# Patient Record
Sex: FEMALE | Race: WHITE | NOT HISPANIC OR LATINO | Employment: OTHER | URBAN - METROPOLITAN AREA
[De-identification: names, ages, dates, MRNs, and addresses within clinical notes are randomized per-mention and may not be internally consistent; named-entity substitution may affect disease eponyms.]

---

## 2023-01-01 ENCOUNTER — APPOINTMENT (OUTPATIENT)
Dept: RADIOLOGY | Facility: HOSPITAL | Age: 88
DRG: 871 | End: 2023-01-01
Payer: MEDICARE

## 2023-01-01 ENCOUNTER — APPOINTMENT (OUTPATIENT)
Dept: CARDIOLOGY | Facility: HOSPITAL | Age: 88
DRG: 871 | End: 2023-01-01
Payer: MEDICARE

## 2023-01-01 ENCOUNTER — HOME HEALTH ADMISSION (OUTPATIENT)
Dept: HOME HEALTH SERVICES | Facility: HOME HEALTH | Age: 88
End: 2023-01-01
Payer: MEDICARE

## 2023-01-01 ENCOUNTER — APPOINTMENT (OUTPATIENT)
Dept: HOME HEALTH SERVICES | Facility: HOME HEALTH | Age: 88
End: 2023-01-01
Payer: MEDICARE

## 2023-01-01 ENCOUNTER — HOSPITAL ENCOUNTER (INPATIENT)
Facility: HOSPITAL | Age: 88
LOS: 1 days | Discharge: HOME HEALTH | DRG: 871 | End: 2023-11-21
Attending: STUDENT IN AN ORGANIZED HEALTH CARE EDUCATION/TRAINING PROGRAM | Admitting: INTERNAL MEDICINE
Payer: MEDICARE

## 2023-01-01 ENCOUNTER — ANCILLARY PROCEDURE (OUTPATIENT)
Dept: RADIOLOGY | Facility: CLINIC | Age: 88
End: 2023-01-01
Payer: MEDICARE

## 2023-01-01 ENCOUNTER — APPOINTMENT (OUTPATIENT)
Dept: RADIOLOGY | Facility: CLINIC | Age: 88
End: 2023-01-01
Payer: MEDICARE

## 2023-01-01 ENCOUNTER — HOSPITAL ENCOUNTER (INPATIENT)
Facility: HOSPITAL | Age: 88
LOS: 1 days | DRG: 871 | End: 2023-12-26
Attending: STUDENT IN AN ORGANIZED HEALTH CARE EDUCATION/TRAINING PROGRAM | Admitting: INTERNAL MEDICINE
Payer: MEDICARE

## 2023-01-01 ENCOUNTER — HOSPITAL ENCOUNTER (OUTPATIENT)
Dept: CARDIOLOGY | Facility: HOSPITAL | Age: 88
Discharge: HOME | End: 2023-11-29
Payer: MEDICARE

## 2023-01-01 ENCOUNTER — LAB REQUISITION (OUTPATIENT)
Dept: LAB | Facility: LAB | Age: 88
End: 2023-01-01
Payer: MEDICARE

## 2023-01-01 ENCOUNTER — APPOINTMENT (OUTPATIENT)
Dept: DIALYSIS | Facility: HOSPITAL | Age: 88
End: 2023-01-01
Payer: MEDICARE

## 2023-01-01 VITALS
HEART RATE: 68 BPM | RESPIRATION RATE: 22 BRPM | BODY MASS INDEX: 25.11 KG/M2 | HEIGHT: 61 IN | OXYGEN SATURATION: 89 % | WEIGHT: 133 LBS | SYSTOLIC BLOOD PRESSURE: 81 MMHG | TEMPERATURE: 95.7 F | DIASTOLIC BLOOD PRESSURE: 42 MMHG

## 2023-01-01 VITALS
WEIGHT: 133.82 LBS | HEART RATE: 82 BPM | BODY MASS INDEX: 25.27 KG/M2 | TEMPERATURE: 96.6 F | HEIGHT: 61 IN | OXYGEN SATURATION: 95 % | RESPIRATION RATE: 18 BRPM | DIASTOLIC BLOOD PRESSURE: 53 MMHG | SYSTOLIC BLOOD PRESSURE: 95 MMHG

## 2023-01-01 DIAGNOSIS — A41.9 SEPSIS DUE TO CELLULITIS (MULTI): ICD-10-CM

## 2023-01-01 DIAGNOSIS — L03.90 SEPSIS DUE TO CELLULITIS (MULTI): Primary | ICD-10-CM

## 2023-01-01 DIAGNOSIS — Z87.01 PERSONAL HISTORY OF PNEUMONIA (RECURRENT): ICD-10-CM

## 2023-01-01 DIAGNOSIS — R65.21 SEPTIC SHOCK (MULTI): ICD-10-CM

## 2023-01-01 DIAGNOSIS — R60.0 LEG EDEMA: ICD-10-CM

## 2023-01-01 DIAGNOSIS — N18.6 CKD (CHRONIC KIDNEY DISEASE) STAGE V REQUIRING CHRONIC DIALYSIS (MULTI): ICD-10-CM

## 2023-01-01 DIAGNOSIS — R00.1 BRADYCARDIA: ICD-10-CM

## 2023-01-01 DIAGNOSIS — R57.9 SHOCK (MULTI): Primary | ICD-10-CM

## 2023-01-01 DIAGNOSIS — L03.90 SEPSIS DUE TO CELLULITIS (MULTI): ICD-10-CM

## 2023-01-01 DIAGNOSIS — R53.1 WEAKNESS: ICD-10-CM

## 2023-01-01 DIAGNOSIS — R57.0 CARDIOGENIC SHOCK (MULTI): ICD-10-CM

## 2023-01-01 DIAGNOSIS — A41.9 SEPTIC SHOCK (MULTI): ICD-10-CM

## 2023-01-01 DIAGNOSIS — I48.91 ATRIAL FIBRILLATION, UNSPECIFIED TYPE (MULTI): ICD-10-CM

## 2023-01-01 DIAGNOSIS — R79.89 ELEVATED TROPONIN: ICD-10-CM

## 2023-01-01 DIAGNOSIS — S81.801A LEG WOUND, RIGHT, INITIAL ENCOUNTER: ICD-10-CM

## 2023-01-01 DIAGNOSIS — R06.02 SHORTNESS OF BREATH: ICD-10-CM

## 2023-01-01 DIAGNOSIS — Z99.2 CKD (CHRONIC KIDNEY DISEASE) STAGE V REQUIRING CHRONIC DIALYSIS (MULTI): ICD-10-CM

## 2023-01-01 DIAGNOSIS — E87.20 LACTIC ACIDOSIS: ICD-10-CM

## 2023-01-01 DIAGNOSIS — T68.XXXA HYPOTHERMIA, INITIAL ENCOUNTER: ICD-10-CM

## 2023-01-01 DIAGNOSIS — M79.89 SWELLING OF ARM: ICD-10-CM

## 2023-01-01 DIAGNOSIS — R09.89 OTHER SPECIFIED SYMPTOMS AND SIGNS INVOLVING THE CIRCULATORY AND RESPIRATORY SYSTEMS: ICD-10-CM

## 2023-01-01 DIAGNOSIS — L08.9 LOCAL INFECTION OF THE SKIN AND SUBCUTANEOUS TISSUE, UNSPECIFIED: ICD-10-CM

## 2023-01-01 DIAGNOSIS — A41.9 SEPSIS DUE TO CELLULITIS (MULTI): Primary | ICD-10-CM

## 2023-01-01 LAB
ALANINE AMINOTRANSFERASE (SGPT) (U/L) IN SER/PLAS: 18 U/L (ref 7–45)
ALBUMIN (G/DL) IN SER/PLAS: 3.6 G/DL (ref 3.4–5)
ALBUMIN SERPL BCP-MCNC: 2.2 G/DL (ref 3.4–5)
ALBUMIN SERPL BCP-MCNC: 2.6 G/DL (ref 3.4–5)
ALBUMIN SERPL BCP-MCNC: 3.3 G/DL (ref 3.4–5)
ALBUMIN SERPL BCP-MCNC: 3.5 G/DL (ref 3.4–5)
ALKALINE PHOSPHATASE (U/L) IN SER/PLAS: 142 U/L (ref 33–136)
ALP SERPL-CCNC: 180 U/L (ref 33–136)
ALP SERPL-CCNC: 188 U/L (ref 33–136)
ALP SERPL-CCNC: 205 U/L (ref 33–136)
ALP SERPL-CCNC: 254 U/L (ref 33–136)
ALT SERPL W P-5'-P-CCNC: 21 U/L (ref 7–45)
ALT SERPL W P-5'-P-CCNC: 24 U/L (ref 7–45)
ALT SERPL W P-5'-P-CCNC: 24 U/L (ref 7–45)
ALT SERPL W P-5'-P-CCNC: 28 U/L (ref 7–45)
AMMONIA PLAS-SCNC: 69 UMOL/L (ref 16–53)
ANION GAP BLDV CALCULATED.4IONS-SCNC: 16 MMOL/L (ref 10–25)
ANION GAP IN SER/PLAS: 13 MMOL/L (ref 10–20)
ANION GAP IN SER/PLAS: 14 MMOL/L (ref 10–20)
ANION GAP SERPL CALC-SCNC: 15 MMOL/L (ref 10–20)
ANION GAP SERPL CALC-SCNC: 16 MMOL/L (ref 10–20)
ANION GAP SERPL CALC-SCNC: 20 MMOL/L (ref 10–20)
ANION GAP SERPL CALC-SCNC: 22 MMOL/L (ref 10–20)
ANION GAP SERPL CALC-SCNC: 25 MMOL/L (ref 10–20)
APTT PPP: 34 SECONDS (ref 27–38)
ASPARTATE AMINOTRANSFERASE (SGOT) (U/L) IN SER/PLAS: 24 U/L (ref 9–39)
AST SERPL W P-5'-P-CCNC: 21 U/L (ref 9–39)
AST SERPL W P-5'-P-CCNC: 24 U/L (ref 9–39)
AST SERPL W P-5'-P-CCNC: 53 U/L (ref 9–39)
AST SERPL W P-5'-P-CCNC: 55 U/L (ref 9–39)
ATRIAL RATE: 108 BPM
BACTERIA BLD CULT: NORMAL
BACTERIA BLD CULT: NORMAL
BACTERIA SPEC CULT: ABNORMAL
BACTERIA SPEC CULT: ABNORMAL
BASE EXCESS BLDV CALC-SCNC: -2.1 MMOL/L (ref -2–3)
BASOPHILS # BLD AUTO: 0.02 X10*3/UL (ref 0–0.1)
BASOPHILS # BLD MANUAL: 0 X10*3/UL (ref 0–0.1)
BASOPHILS (10*3/UL) IN BLOOD BY AUTOMATED COUNT: 0.06 X10E9/L (ref 0–0.1)
BASOPHILS NFR BLD AUTO: 0.1 %
BASOPHILS NFR BLD MANUAL: 0 %
BASOPHILS/100 LEUKOCYTES IN BLOOD BY AUTOMATED COUNT: 1.2 % (ref 0–2)
BILIRUB SERPL-MCNC: 1.1 MG/DL (ref 0–1.2)
BILIRUB SERPL-MCNC: 1.2 MG/DL (ref 0–1.2)
BILIRUB SERPL-MCNC: 1.3 MG/DL (ref 0–1.2)
BILIRUB SERPL-MCNC: 1.5 MG/DL (ref 0–1.2)
BILIRUBIN TOTAL (MG/DL) IN SER/PLAS: 1.1 MG/DL (ref 0–1.2)
BNP SERPL-MCNC: 1268 PG/ML (ref 0–99)
BNP SERPL-MCNC: 3789 PG/ML (ref 0–99)
BODY TEMPERATURE: ABNORMAL
BUN SERPL-MCNC: 37 MG/DL (ref 6–23)
BUN SERPL-MCNC: 56 MG/DL (ref 6–23)
BUN SERPL-MCNC: 56 MG/DL (ref 6–23)
BUN SERPL-MCNC: 68 MG/DL (ref 6–23)
BUN SERPL-MCNC: 72 MG/DL (ref 6–23)
BURR CELLS BLD QL SMEAR: ABNORMAL
CA-I BLDV-SCNC: 1.28 MMOL/L (ref 1.1–1.33)
CALCIUM (MG/DL) IN SER/PLAS: 9.4 MG/DL (ref 8.6–10.3)
CALCIUM (MG/DL) IN SER/PLAS: 9.8 MG/DL (ref 8.6–10.3)
CALCIUM SERPL-MCNC: 8.3 MG/DL (ref 8.6–10.3)
CALCIUM SERPL-MCNC: 8.5 MG/DL (ref 8.6–10.3)
CALCIUM SERPL-MCNC: 8.6 MG/DL (ref 8.6–10.3)
CALCIUM SERPL-MCNC: 9 MG/DL (ref 8.6–10.3)
CALCIUM SERPL-MCNC: 9.5 MG/DL (ref 8.6–10.3)
CARBON DIOXIDE, TOTAL (MMOL/L) IN SER/PLAS: 28 MMOL/L (ref 21–32)
CARBON DIOXIDE, TOTAL (MMOL/L) IN SER/PLAS: 33 MMOL/L (ref 21–32)
CARDIAC TROPONIN I PNL SERPL HS: 102 NG/L (ref 0–13)
CARDIAC TROPONIN I PNL SERPL HS: 207 NG/L (ref 0–13)
CARDIAC TROPONIN I PNL SERPL HS: 64 NG/L (ref 0–13)
CARDIAC TROPONIN I PNL SERPL HS: 67 NG/L (ref 0–13)
CARDIAC TROPONIN I PNL SERPL HS: 99 NG/L (ref 0–13)
CHLORIDE (MMOL/L) IN SER/PLAS: 95 MMOL/L (ref 98–107)
CHLORIDE (MMOL/L) IN SER/PLAS: 98 MMOL/L (ref 98–107)
CHLORIDE BLDV-SCNC: 92 MMOL/L (ref 98–107)
CHLORIDE SERPL-SCNC: 90 MMOL/L (ref 98–107)
CHLORIDE SERPL-SCNC: 91 MMOL/L (ref 98–107)
CHLORIDE SERPL-SCNC: 93 MMOL/L (ref 98–107)
CHLORIDE SERPL-SCNC: 93 MMOL/L (ref 98–107)
CHLORIDE SERPL-SCNC: 97 MMOL/L (ref 98–107)
CHOLESTEROL (MG/DL) IN SER/PLAS: 98 MG/DL (ref 0–199)
CHOLESTEROL IN HDL (MG/DL) IN SER/PLAS: 57.5 MG/DL
CHOLESTEROL/HDL RATIO: 1.7
CO2 SERPL-SCNC: 16 MMOL/L (ref 21–32)
CO2 SERPL-SCNC: 21 MMOL/L (ref 21–32)
CO2 SERPL-SCNC: 24 MMOL/L (ref 21–32)
CO2 SERPL-SCNC: 25 MMOL/L (ref 21–32)
CO2 SERPL-SCNC: 26 MMOL/L (ref 21–32)
CREAT SERPL-MCNC: 2.18 MG/DL (ref 0.5–1.05)
CREAT SERPL-MCNC: 3.01 MG/DL (ref 0.5–1.05)
CREAT SERPL-MCNC: 3.13 MG/DL (ref 0.5–1.05)
CREAT SERPL-MCNC: 3.22 MG/DL (ref 0.5–1.05)
CREAT SERPL-MCNC: 3.35 MG/DL (ref 0.5–1.05)
CREATININE (MG/DL) IN SER/PLAS: 2.02 MG/DL (ref 0.5–1.05)
CREATININE (MG/DL) IN SER/PLAS: 2.42 MG/DL (ref 0.5–1.05)
CRITICAL CALL TIME: 1553
CRITICAL CALLED BY: ABNORMAL
CRITICAL CALLED TO: ABNORMAL
CRITICAL READ BACK: ABNORMAL
EOSINOPHIL # BLD AUTO: 0 X10*3/UL (ref 0–0.4)
EOSINOPHIL # BLD MANUAL: 0 X10*3/UL (ref 0–0.4)
EOSINOPHIL NFR BLD AUTO: 0 %
EOSINOPHIL NFR BLD MANUAL: 0 %
EOSINOPHILS (10*3/UL) IN BLOOD BY AUTOMATED COUNT: 0.09 X10E9/L (ref 0–0.4)
EOSINOPHILS/100 LEUKOCYTES IN BLOOD BY AUTOMATED COUNT: 1.8 % (ref 0–6)
ERYTHROCYTE DISTRIBUTION WIDTH (RATIO) BY AUTOMATED COUNT: 16.4 % (ref 11.5–14.5)
ERYTHROCYTE DISTRIBUTION WIDTH (RATIO) BY AUTOMATED COUNT: 22.2 % (ref 11.5–14.5)
ERYTHROCYTE MEAN CORPUSCULAR HEMOGLOBIN CONCENTRATION (G/DL) BY AUTOMATED: 31.1 G/DL (ref 32–36)
ERYTHROCYTE MEAN CORPUSCULAR HEMOGLOBIN CONCENTRATION (G/DL) BY AUTOMATED: 32.4 G/DL (ref 32–36)
ERYTHROCYTE MEAN CORPUSCULAR VOLUME (FL) BY AUTOMATED COUNT: 100 FL (ref 80–100)
ERYTHROCYTE MEAN CORPUSCULAR VOLUME (FL) BY AUTOMATED COUNT: 101 FL (ref 80–100)
ERYTHROCYTE [DISTWIDTH] IN BLOOD BY AUTOMATED COUNT: 20 % (ref 11.5–14.5)
ERYTHROCYTE [DISTWIDTH] IN BLOOD BY AUTOMATED COUNT: 20.3 % (ref 11.5–14.5)
ERYTHROCYTE [DISTWIDTH] IN BLOOD BY AUTOMATED COUNT: 20.7 % (ref 11.5–14.5)
ERYTHROCYTE [DISTWIDTH] IN BLOOD BY AUTOMATED COUNT: 24 % (ref 11.5–14.5)
ERYTHROCYTES (10*6/UL) IN BLOOD BY AUTOMATED COUNT: 2.99 X10E12/L (ref 4–5.2)
ERYTHROCYTES (10*6/UL) IN BLOOD BY AUTOMATED COUNT: 3.03 X10E12/L (ref 4–5.2)
EST. AVERAGE GLUCOSE BLD GHB EST-MCNC: 103 MG/DL
EST. AVERAGE GLUCOSE BLD GHB EST-MCNC: 134 MG/DL
FERRITIN (UG/LL) IN SER/PLAS: 619 UG/L (ref 8–150)
FLUAV RNA RESP QL NAA+PROBE: NOT DETECTED
FLUBV RNA RESP QL NAA+PROBE: NOT DETECTED
GFR FEMALE: 19 ML/MIN/1.73M2
GFR FEMALE: 24 ML/MIN/1.73M2
GFR SERPL CREATININE-BSD FRML MDRD: 13 ML/MIN/1.73M*2
GFR SERPL CREATININE-BSD FRML MDRD: 14 ML/MIN/1.73M*2
GFR SERPL CREATININE-BSD FRML MDRD: 14 ML/MIN/1.73M*2
GFR SERPL CREATININE-BSD FRML MDRD: 15 ML/MIN/1.73M*2
GFR SERPL CREATININE-BSD FRML MDRD: 22 ML/MIN/1.73M*2
GLUCOSE (MG/DL) IN SER/PLAS: 128 MG/DL (ref 74–99)
GLUCOSE (MG/DL) IN SER/PLAS: 241 MG/DL (ref 74–99)
GLUCOSE BLD MANUAL STRIP-MCNC: 120 MG/DL (ref 74–99)
GLUCOSE BLD MANUAL STRIP-MCNC: 247 MG/DL (ref 74–99)
GLUCOSE BLD MANUAL STRIP-MCNC: 260 MG/DL (ref 74–99)
GLUCOSE BLD MANUAL STRIP-MCNC: 54 MG/DL (ref 74–99)
GLUCOSE BLD MANUAL STRIP-MCNC: 76 MG/DL (ref 74–99)
GLUCOSE BLDV-MCNC: 141 MG/DL (ref 74–99)
GLUCOSE SERPL-MCNC: 121 MG/DL (ref 74–99)
GLUCOSE SERPL-MCNC: 126 MG/DL (ref 74–99)
GLUCOSE SERPL-MCNC: 160 MG/DL (ref 74–99)
GLUCOSE SERPL-MCNC: 241 MG/DL (ref 74–99)
GLUCOSE SERPL-MCNC: 270 MG/DL (ref 74–99)
GRAM STN SPEC: ABNORMAL
HBA1C MFR BLD: 5.2 %
HBA1C MFR BLD: 6.3 %
HCO3 BLDV-SCNC: 22.2 MMOL/L (ref 22–26)
HCT VFR BLD AUTO: 26.7 % (ref 36–46)
HCT VFR BLD AUTO: 28.4 % (ref 36–46)
HCT VFR BLD AUTO: 29.7 % (ref 36–46)
HCT VFR BLD AUTO: 31.6 % (ref 36–46)
HCT VFR BLD EST: 24 % (ref 36–46)
HEMATOCRIT (%) IN BLOOD BY AUTOMATED COUNT: 29.9 % (ref 36–46)
HEMATOCRIT (%) IN BLOOD BY AUTOMATED COUNT: 30.5 % (ref 36–46)
HEMOGLOBIN (G/DL) IN BLOOD: 9.5 G/DL (ref 12–16)
HEMOGLOBIN (G/DL) IN BLOOD: 9.7 G/DL (ref 12–16)
HGB BLD-MCNC: 7.8 G/DL (ref 12–16)
HGB BLD-MCNC: 8.7 G/DL (ref 12–16)
HGB BLD-MCNC: 9.2 G/DL (ref 12–16)
HGB BLD-MCNC: 9.8 G/DL (ref 12–16)
HGB BLDV-MCNC: 7.9 G/DL (ref 12–16)
IMM GRANULOCYTES # BLD AUTO: 0.09 X10*3/UL (ref 0–0.5)
IMM GRANULOCYTES # BLD AUTO: 0.1 X10*3/UL (ref 0–0.5)
IMM GRANULOCYTES NFR BLD AUTO: 0.7 % (ref 0–0.9)
IMM GRANULOCYTES NFR BLD AUTO: 1 % (ref 0–0.9)
IMMATURE GRANULOCYTES/100 LEUKOCYTES IN BLOOD BY AUTOMATED COUNT: 0.6 % (ref 0–0.9)
INHALED O2 CONCENTRATION: 21 %
INR PPP: 2.7 (ref 0.9–1.1)
IRON (UG/DL) IN SER/PLAS: 43 UG/DL (ref 35–150)
IRON BINDING CAPACITY (UG/DL) IN SER/PLAS: 263 UG/DL (ref 240–445)
IRON SATURATION (%) IN SER/PLAS: 16 % (ref 25–45)
LACTATE BLDV-SCNC: 5.2 MMOL/L (ref 0.4–2)
LACTATE SERPL-SCNC: 1.7 MMOL/L (ref 0.4–2)
LACTATE SERPL-SCNC: 1.9 MMOL/L (ref 0.4–2)
LACTATE SERPL-SCNC: 2.7 MMOL/L (ref 0.4–2)
LACTATE SERPL-SCNC: 3.6 MMOL/L (ref 0.4–2)
LACTATE SERPL-SCNC: 4.8 MMOL/L (ref 0.4–2)
LACTATE SERPL-SCNC: 5 MMOL/L (ref 0.4–2)
LACTATE SERPL-SCNC: 5.7 MMOL/L (ref 0.4–2)
LDL: 31 MG/DL (ref 0–99)
LEUKOCYTES (10*3/UL) IN BLOOD BY AUTOMATED COUNT: 4.9 X10E9/L (ref 4.4–11.3)
LEUKOCYTES (10*3/UL) IN BLOOD BY AUTOMATED COUNT: 6.9 X10E9/L (ref 4.4–11.3)
LYMPHOCYTES # BLD AUTO: 0.32 X10*3/UL (ref 0.8–3)
LYMPHOCYTES # BLD MANUAL: 0.2 X10*3/UL (ref 0.8–3)
LYMPHOCYTES (10*3/UL) IN BLOOD BY AUTOMATED COUNT: 0.76 X10E9/L (ref 0.8–3)
LYMPHOCYTES NFR BLD AUTO: 2.3 %
LYMPHOCYTES NFR BLD MANUAL: 2 %
LYMPHOCYTES/100 LEUKOCYTES IN BLOOD BY AUTOMATED COUNT: 15.4 % (ref 13–44)
MCH RBC QN AUTO: 33.4 PG (ref 26–34)
MCH RBC QN AUTO: 33.9 PG (ref 26–34)
MCH RBC QN AUTO: 33.9 PG (ref 26–34)
MCH RBC QN AUTO: 34.5 PG (ref 26–34)
MCHC RBC AUTO-ENTMCNC: 29.2 G/DL (ref 32–36)
MCHC RBC AUTO-ENTMCNC: 30.6 G/DL (ref 32–36)
MCHC RBC AUTO-ENTMCNC: 31 G/DL (ref 32–36)
MCHC RBC AUTO-ENTMCNC: 31 G/DL (ref 32–36)
MCV RBC AUTO: 108 FL (ref 80–100)
MCV RBC AUTO: 110 FL (ref 80–100)
MCV RBC AUTO: 111 FL (ref 80–100)
MCV RBC AUTO: 118 FL (ref 80–100)
METAMYELOCYTES # BLD MANUAL: 0.2 X10*3/UL
METAMYELOCYTES NFR BLD MANUAL: 2 %
MONOCYTES # BLD AUTO: 0.76 X10*3/UL (ref 0.05–0.8)
MONOCYTES # BLD MANUAL: 0.2 X10*3/UL (ref 0.05–0.8)
MONOCYTES (10*3/UL) IN BLOOD BY AUTOMATED COUNT: 0.57 X10E9/L (ref 0.05–0.8)
MONOCYTES NFR BLD AUTO: 5.5 %
MONOCYTES NFR BLD MANUAL: 2 %
MONOCYTES/100 LEUKOCYTES IN BLOOD BY AUTOMATED COUNT: 11.5 % (ref 2–10)
NEUTROPHILS # BLD AUTO: 12.56 X10*3/UL (ref 1.6–5.5)
NEUTROPHILS # BLD MANUAL: 9.5 X10*3/UL (ref 1.6–5.5)
NEUTROPHILS (10*3/UL) IN BLOOD BY AUTOMATED COUNT: 3.43 X10E9/L (ref 1.6–5.5)
NEUTROPHILS NFR BLD AUTO: 91.4 %
NEUTROPHILS/100 LEUKOCYTES IN BLOOD BY AUTOMATED COUNT: 69.5 % (ref 40–80)
NEUTS BAND # BLD MANUAL: 1.62 X10*3/UL (ref 0–0.5)
NEUTS BAND NFR BLD MANUAL: 16 %
NEUTS SEG # BLD MANUAL: 7.88 X10*3/UL (ref 1.6–5)
NEUTS SEG NFR BLD MANUAL: 78 %
NRBC BLD-RTO: 0 /100 WBCS (ref 0–0)
NRBC BLD-RTO: 0 /100 WBCS (ref 0–0)
NRBC BLD-RTO: 0.1 /100 WBCS (ref 0–0)
NRBC BLD-RTO: 3 /100 WBCS (ref 0–0)
OXYHGB MFR BLDV: 77.3 % (ref 45–75)
PATH REVIEW-CBC DIFFERENTIAL: NORMAL
PCO2 BLDV: 35 MM HG (ref 41–51)
PH BLDV: 7.41 PH (ref 7.33–7.43)
PLATELET # BLD AUTO: 118 X10*3/UL (ref 150–450)
PLATELET # BLD AUTO: 134 X10*3/UL (ref 150–450)
PLATELET # BLD AUTO: 161 X10*3/UL (ref 150–450)
PLATELET # BLD AUTO: 44 X10*3/UL (ref 150–450)
PLATELETS (10*3/UL) IN BLOOD AUTOMATED COUNT: 143 X10E9/L (ref 150–450)
PLATELETS (10*3/UL) IN BLOOD AUTOMATED COUNT: 204 X10E9/L (ref 150–450)
PO2 BLDV: 52 MM HG (ref 35–45)
POLYCHROMASIA BLD QL SMEAR: ABNORMAL
POLYCHROMASIA BLD QL SMEAR: NORMAL
POTASSIUM (MMOL/L) IN SER/PLAS: 4.1 MMOL/L (ref 3.5–5.3)
POTASSIUM (MMOL/L) IN SER/PLAS: 4.2 MMOL/L (ref 3.5–5.3)
POTASSIUM BLDV-SCNC: 4.5 MMOL/L (ref 3.5–5.3)
POTASSIUM SERPL-SCNC: 4.1 MMOL/L (ref 3.5–5.3)
POTASSIUM SERPL-SCNC: 4.2 MMOL/L (ref 3.5–5.3)
POTASSIUM SERPL-SCNC: 4.4 MMOL/L (ref 3.5–5.3)
POTASSIUM SERPL-SCNC: 4.4 MMOL/L (ref 3.5–5.3)
POTASSIUM SERPL-SCNC: 4.6 MMOL/L (ref 3.5–5.3)
PROT SERPL-MCNC: 4.4 G/DL (ref 6.4–8.2)
PROT SERPL-MCNC: 5 G/DL (ref 6.4–8.2)
PROT SERPL-MCNC: 5.8 G/DL (ref 6.4–8.2)
PROT SERPL-MCNC: 6.4 G/DL (ref 6.4–8.2)
PROTEIN TOTAL: 6.5 G/DL (ref 6.4–8.2)
PROTHROMBIN TIME: 31.1 SECONDS (ref 9.8–12.8)
Q ONSET: 214 MS
QRS COUNT: 16 BEATS
QRS DURATION: 96 MS
QT INTERVAL: 256 MS
QTC CALCULATION(BAZETT): 330 MS
QTC FREDERICIA: 303 MS
R AXIS: -60 DEGREES
RBC # BLD AUTO: 2.26 X10*6/UL (ref 4–5.2)
RBC # BLD AUTO: 2.57 X10*6/UL (ref 4–5.2)
RBC # BLD AUTO: 2.71 X10*6/UL (ref 4–5.2)
RBC # BLD AUTO: 2.93 X10*6/UL (ref 4–5.2)
RBC MORPH BLD: ABNORMAL
RBC MORPH BLD: NORMAL
SAO2 % BLDV: 80 % (ref 45–75)
SARS-COV-2 RNA RESP QL NAA+PROBE: DETECTED
SODIUM (MMOL/L) IN SER/PLAS: 136 MMOL/L (ref 136–145)
SODIUM (MMOL/L) IN SER/PLAS: 137 MMOL/L (ref 136–145)
SODIUM BLDV-SCNC: 126 MMOL/L (ref 136–145)
SODIUM SERPL-SCNC: 129 MMOL/L (ref 136–145)
SODIUM SERPL-SCNC: 129 MMOL/L (ref 136–145)
SODIUM SERPL-SCNC: 131 MMOL/L (ref 136–145)
SODIUM SERPL-SCNC: 131 MMOL/L (ref 136–145)
SODIUM SERPL-SCNC: 133 MMOL/L (ref 136–145)
STAPHYLOCOCCUS SPEC CULT: ABNORMAL
T AXIS: 204 DEGREES
T OFFSET: 342 MS
T4 FREE SERPL-MCNC: 0.5 NG/DL (ref 0.61–1.12)
TOTAL CELLS COUNTED BLD: 100
TRIGLYCERIDE (MG/DL) IN SER/PLAS: 48 MG/DL (ref 0–149)
TSH SERPL-ACNC: 8.23 MIU/L (ref 0.44–3.98)
UREA NITROGEN (MG/DL) IN SER/PLAS: 20 MG/DL (ref 6–23)
UREA NITROGEN (MG/DL) IN SER/PLAS: 42 MG/DL (ref 6–23)
VENTRICULAR RATE: 100 BPM
VLDL: 10 MG/DL (ref 0–40)
WBC # BLD AUTO: 10.1 X10*3/UL (ref 4.4–11.3)
WBC # BLD AUTO: 10.5 X10*3/UL (ref 4.4–11.3)
WBC # BLD AUTO: 13.8 X10*3/UL (ref 4.4–11.3)
WBC # BLD AUTO: 14.7 X10*3/UL (ref 4.4–11.3)

## 2023-01-01 PROCEDURE — 2500000001 HC RX 250 WO HCPCS SELF ADMINISTERED DRUGS (ALT 637 FOR MEDICARE OP): Performed by: NURSE PRACTITIONER

## 2023-01-01 PROCEDURE — 02HV33Z INSERTION OF INFUSION DEVICE INTO SUPERIOR VENA CAVA, PERCUTANEOUS APPROACH: ICD-10-PCS

## 2023-01-01 PROCEDURE — 36415 COLL VENOUS BLD VENIPUNCTURE: CPT

## 2023-01-01 PROCEDURE — 36415 COLL VENOUS BLD VENIPUNCTURE: CPT | Performed by: NURSE PRACTITIONER

## 2023-01-01 PROCEDURE — 72125 CT NECK SPINE W/O DYE: CPT | Performed by: RADIOLOGY

## 2023-01-01 PROCEDURE — 36556 INSERT NON-TUNNEL CV CATH: CPT

## 2023-01-01 PROCEDURE — P9047 ALBUMIN (HUMAN), 25%, 50ML: HCPCS | Mod: JZ | Performed by: INTERNAL MEDICINE

## 2023-01-01 PROCEDURE — 1200000002 HC GENERAL ROOM WITH TELEMETRY DAILY

## 2023-01-01 PROCEDURE — 85027 COMPLETE CBC AUTOMATED: CPT | Mod: STJLAB

## 2023-01-01 PROCEDURE — 96375 TX/PRO/DX INJ NEW DRUG ADDON: CPT

## 2023-01-01 PROCEDURE — 80053 COMPREHEN METABOLIC PANEL: CPT

## 2023-01-01 PROCEDURE — 85007 BL SMEAR W/DIFF WBC COUNT: CPT | Mod: STJLAB

## 2023-01-01 PROCEDURE — C9113 INJ PANTOPRAZOLE SODIUM, VIA: HCPCS

## 2023-01-01 PROCEDURE — 84145 PROCALCITONIN (PCT): CPT | Mod: STJLAB | Performed by: NURSE PRACTITIONER

## 2023-01-01 PROCEDURE — 84484 ASSAY OF TROPONIN QUANT: CPT | Performed by: NURSE PRACTITIONER

## 2023-01-01 PROCEDURE — 2500000002 HC RX 250 W HCPCS SELF ADMINISTERED DRUGS (ALT 637 FOR MEDICARE OP, ALT 636 FOR OP/ED): Performed by: NURSE PRACTITIONER

## 2023-01-01 PROCEDURE — 83880 ASSAY OF NATRIURETIC PEPTIDE: CPT

## 2023-01-01 PROCEDURE — 87040 BLOOD CULTURE FOR BACTERIA: CPT | Mod: STJLAB

## 2023-01-01 PROCEDURE — 74176 CT ABD & PELVIS W/O CONTRAST: CPT

## 2023-01-01 PROCEDURE — 99222 1ST HOSP IP/OBS MODERATE 55: CPT | Performed by: NURSE PRACTITIONER

## 2023-01-01 PROCEDURE — 87081 CULTURE SCREEN ONLY: CPT | Mod: STJLAB | Performed by: NURSE PRACTITIONER

## 2023-01-01 PROCEDURE — 71045 X-RAY EXAM CHEST 1 VIEW: CPT

## 2023-01-01 PROCEDURE — 2500000004 HC RX 250 GENERAL PHARMACY W/ HCPCS (ALT 636 FOR OP/ED): Performed by: EMERGENCY MEDICINE

## 2023-01-01 PROCEDURE — 96374 THER/PROPH/DIAG INJ IV PUSH: CPT | Mod: 59

## 2023-01-01 PROCEDURE — 87081 CULTURE SCREEN ONLY: CPT | Mod: STJLAB

## 2023-01-01 PROCEDURE — 80048 BASIC METABOLIC PNL TOTAL CA: CPT | Performed by: NURSE PRACTITIONER

## 2023-01-01 PROCEDURE — 3E033XZ INTRODUCTION OF VASOPRESSOR INTO PERIPHERAL VEIN, PERCUTANEOUS APPROACH: ICD-10-PCS

## 2023-01-01 PROCEDURE — 2500000005 HC RX 250 GENERAL PHARMACY W/O HCPCS

## 2023-01-01 PROCEDURE — 2500000004 HC RX 250 GENERAL PHARMACY W/ HCPCS (ALT 636 FOR OP/ED): Performed by: NURSE PRACTITIONER

## 2023-01-01 PROCEDURE — 87070 CULTURE OTHR SPECIMN AEROBIC: CPT

## 2023-01-01 PROCEDURE — 2020000001 HC ICU ROOM DAILY

## 2023-01-01 PROCEDURE — 71045 X-RAY EXAM CHEST 1 VIEW: CPT | Mod: FY

## 2023-01-01 PROCEDURE — 83036 HEMOGLOBIN GLYCOSYLATED A1C: CPT | Mod: STJLAB

## 2023-01-01 PROCEDURE — 70450 CT HEAD/BRAIN W/O DYE: CPT | Performed by: RADIOLOGY

## 2023-01-01 PROCEDURE — 96365 THER/PROPH/DIAG IV INF INIT: CPT

## 2023-01-01 PROCEDURE — 84484 ASSAY OF TROPONIN QUANT: CPT

## 2023-01-01 PROCEDURE — 97161 PT EVAL LOW COMPLEX 20 MIN: CPT | Mod: GP

## 2023-01-01 PROCEDURE — 99291 CRITICAL CARE FIRST HOUR: CPT | Mod: 25 | Performed by: STUDENT IN AN ORGANIZED HEALTH CARE EDUCATION/TRAINING PROGRAM

## 2023-01-01 PROCEDURE — 85060 BLOOD SMEAR INTERPRETATION: CPT | Performed by: PATHOLOGY

## 2023-01-01 PROCEDURE — 96367 TX/PROPH/DG ADDL SEQ IV INF: CPT

## 2023-01-01 PROCEDURE — 93005 ELECTROCARDIOGRAM TRACING: CPT

## 2023-01-01 PROCEDURE — 93970 EXTREMITY STUDY: CPT

## 2023-01-01 PROCEDURE — 2500000004 HC RX 250 GENERAL PHARMACY W/ HCPCS (ALT 636 FOR OP/ED)

## 2023-01-01 PROCEDURE — 99285 EMERGENCY DEPT VISIT HI MDM: CPT | Mod: 25 | Performed by: STUDENT IN AN ORGANIZED HEALTH CARE EDUCATION/TRAINING PROGRAM

## 2023-01-01 PROCEDURE — 71046 X-RAY EXAM CHEST 2 VIEWS: CPT | Performed by: RADIOLOGY

## 2023-01-01 PROCEDURE — 80053 COMPREHEN METABOLIC PANEL: CPT | Performed by: NURSE PRACTITIONER

## 2023-01-01 PROCEDURE — 37799 UNLISTED PX VASCULAR SURGERY: CPT

## 2023-01-01 PROCEDURE — 93931 UPPER EXTREMITY STUDY: CPT

## 2023-01-01 PROCEDURE — 73590 X-RAY EXAM OF LOWER LEG: CPT | Mod: RIGHT SIDE | Performed by: RADIOLOGY

## 2023-01-01 PROCEDURE — 71046 X-RAY EXAM CHEST 2 VIEWS: CPT

## 2023-01-01 PROCEDURE — 71045 X-RAY EXAM CHEST 1 VIEW: CPT | Performed by: STUDENT IN AN ORGANIZED HEALTH CARE EDUCATION/TRAINING PROGRAM

## 2023-01-01 PROCEDURE — 87181 SC STD AGAR DILUTION PER AGT: CPT | Mod: STJLAB

## 2023-01-01 PROCEDURE — 84132 ASSAY OF SERUM POTASSIUM: CPT | Performed by: NURSE PRACTITIONER

## 2023-01-01 PROCEDURE — 71250 CT THORAX DX C-: CPT | Performed by: RADIOLOGY

## 2023-01-01 PROCEDURE — 85610 PROTHROMBIN TIME: CPT

## 2023-01-01 PROCEDURE — 2500000004 HC RX 250 GENERAL PHARMACY W/ HCPCS (ALT 636 FOR OP/ED): Mod: JZ | Performed by: INTERNAL MEDICINE

## 2023-01-01 PROCEDURE — 83880 ASSAY OF NATRIURETIC PEPTIDE: CPT | Mod: STJLAB | Performed by: STUDENT IN AN ORGANIZED HEALTH CARE EDUCATION/TRAINING PROGRAM

## 2023-01-01 PROCEDURE — 73590 X-RAY EXAM OF LOWER LEG: CPT | Mod: RT,FY

## 2023-01-01 PROCEDURE — 82947 ASSAY GLUCOSE BLOOD QUANT: CPT

## 2023-01-01 PROCEDURE — 83605 ASSAY OF LACTIC ACID: CPT | Performed by: NURSE PRACTITIONER

## 2023-01-01 PROCEDURE — 83605 ASSAY OF LACTIC ACID: CPT

## 2023-01-01 PROCEDURE — 37799 UNLISTED PX VASCULAR SURGERY: CPT | Performed by: NURSE PRACTITIONER

## 2023-01-01 PROCEDURE — 83036 HEMOGLOBIN GLYCOSYLATED A1C: CPT | Mod: STJLAB | Performed by: NURSE PRACTITIONER

## 2023-01-01 PROCEDURE — 99285 EMERGENCY DEPT VISIT HI MDM: CPT | Performed by: STUDENT IN AN ORGANIZED HEALTH CARE EDUCATION/TRAINING PROGRAM

## 2023-01-01 PROCEDURE — 96375 TX/PRO/DX INJ NEW DRUG ADDON: CPT | Mod: 59

## 2023-01-01 PROCEDURE — 5A1D70Z PERFORMANCE OF URINARY FILTRATION, INTERMITTENT, LESS THAN 6 HOURS PER DAY: ICD-10-PCS | Performed by: INTERNAL MEDICINE

## 2023-01-01 PROCEDURE — 82140 ASSAY OF AMMONIA: CPT | Performed by: NURSE PRACTITIONER

## 2023-01-01 PROCEDURE — 2500000001 HC RX 250 WO HCPCS SELF ADMINISTERED DRUGS (ALT 637 FOR MEDICARE OP): Performed by: INTERNAL MEDICINE

## 2023-01-01 PROCEDURE — 71045 X-RAY EXAM CHEST 1 VIEW: CPT | Performed by: RADIOLOGY

## 2023-01-01 PROCEDURE — 85025 COMPLETE CBC W/AUTO DIFF WBC: CPT

## 2023-01-01 PROCEDURE — 93010 ELECTROCARDIOGRAM REPORT: CPT | Performed by: INTERNAL MEDICINE

## 2023-01-01 PROCEDURE — 84443 ASSAY THYROID STIM HORMONE: CPT | Performed by: NURSE PRACTITIONER

## 2023-01-01 PROCEDURE — 87636 SARSCOV2 & INF A&B AMP PRB: CPT

## 2023-01-01 PROCEDURE — 36620 INSERTION CATHETER ARTERY: CPT | Mod: ZK | Performed by: NURSE PRACTITIONER

## 2023-01-01 PROCEDURE — 87186 SC STD MICRODIL/AGAR DIL: CPT

## 2023-01-01 PROCEDURE — 36620 INSERTION CATHETER ARTERY: CPT | Performed by: NURSE PRACTITIONER

## 2023-01-01 PROCEDURE — 84439 ASSAY OF FREE THYROXINE: CPT | Performed by: NURSE PRACTITIONER

## 2023-01-01 PROCEDURE — 85027 COMPLETE CBC AUTOMATED: CPT | Performed by: NURSE PRACTITIONER

## 2023-01-01 PROCEDURE — 84132 ASSAY OF SERUM POTASSIUM: CPT

## 2023-01-01 PROCEDURE — 87075 CULTR BACTERIA EXCEPT BLOOD: CPT

## 2023-01-01 PROCEDURE — 96365 THER/PROPH/DIAG IV INF INIT: CPT | Mod: 59

## 2023-01-01 PROCEDURE — 74176 CT ABD & PELVIS W/O CONTRAST: CPT | Performed by: RADIOLOGY

## 2023-01-01 PROCEDURE — 70450 CT HEAD/BRAIN W/O DYE: CPT

## 2023-01-01 PROCEDURE — 71250 CT THORAX DX C-: CPT

## 2023-01-01 PROCEDURE — 97165 OT EVAL LOW COMPLEX 30 MIN: CPT | Mod: GO

## 2023-01-01 PROCEDURE — 8010000001 HC DIALYSIS - HEMODIALYSIS PER DAY

## 2023-01-01 PROCEDURE — 72125 CT NECK SPINE W/O DYE: CPT

## 2023-01-01 PROCEDURE — 93010 ELECTROCARDIOGRAM REPORT: CPT | Performed by: STUDENT IN AN ORGANIZED HEALTH CARE EDUCATION/TRAINING PROGRAM

## 2023-01-01 RX ORDER — AMOXICILLIN AND CLAVULANATE POTASSIUM 500; 125 MG/1; MG/1
1 TABLET, FILM COATED ORAL 2 TIMES DAILY
Qty: 14 TABLET | Refills: 0 | Status: SHIPPED | OUTPATIENT
Start: 2023-01-01 | End: 2023-01-01

## 2023-01-01 RX ORDER — ASPIRIN 81 MG/1
81 TABLET ORAL DAILY
Status: DISCONTINUED | OUTPATIENT
Start: 2023-12-27 | End: 2023-01-01

## 2023-01-01 RX ORDER — LOSARTAN POTASSIUM 50 MG/1
50 TABLET ORAL DAILY
Status: ON HOLD | COMMUNITY
End: 2023-01-01 | Stop reason: ENTERED-IN-ERROR

## 2023-01-01 RX ORDER — NOREPINEPHRINE BITARTRATE/D5W 8 MG/250ML
PLASTIC BAG, INJECTION (ML) INTRAVENOUS
Status: COMPLETED
Start: 2023-01-01 | End: 2023-01-01

## 2023-01-01 RX ORDER — EPINEPHRINE HCL IN DEXTROSE 5% 4MG/250ML
.01-1 PLASTIC BAG, INJECTION (ML) INTRAVENOUS CONTINUOUS
Status: DISCONTINUED | OUTPATIENT
Start: 2023-01-01 | End: 2023-01-01

## 2023-01-01 RX ORDER — DOCUSATE SODIUM 100 MG/1
100 CAPSULE, LIQUID FILLED ORAL 2 TIMES DAILY PRN
Status: DISCONTINUED | OUTPATIENT
Start: 2023-01-01 | End: 2023-01-01

## 2023-01-01 RX ORDER — MORPHINE SULFATE IN 0.9 % NACL 30 MG/30ML
0-10 PATIENT CONTROLLED ANALGESIA SYRINGE INTRAVENOUS CONTINUOUS
Status: DISCONTINUED | OUTPATIENT
Start: 2023-01-01 | End: 2023-12-27 | Stop reason: HOSPADM

## 2023-01-01 RX ORDER — HALOPERIDOL 5 MG/ML
1 INJECTION INTRAMUSCULAR EVERY 4 HOURS PRN
Status: DISCONTINUED | OUTPATIENT
Start: 2023-01-01 | End: 2023-12-27 | Stop reason: HOSPADM

## 2023-01-01 RX ORDER — DEXTROSE 50 % IN WATER (D50W) INTRAVENOUS SYRINGE
25
Status: DISCONTINUED | OUTPATIENT
Start: 2023-01-01 | End: 2023-01-01 | Stop reason: HOSPADM

## 2023-01-01 RX ORDER — ACETAMINOPHEN 650 MG/1
650 SUPPOSITORY RECTAL EVERY 4 HOURS PRN
Status: DISCONTINUED | OUTPATIENT
Start: 2023-01-01 | End: 2023-01-01 | Stop reason: HOSPADM

## 2023-01-01 RX ORDER — SITAGLIPTIN 25 MG/1
25 TABLET, FILM COATED ORAL DAILY
COMMUNITY
End: 2023-12-27 | Stop reason: HOSPADM

## 2023-01-01 RX ORDER — HEPARIN SODIUM 1000 [USP'U]/ML
1000 INJECTION, SOLUTION INTRAVENOUS; SUBCUTANEOUS
Status: DISCONTINUED | OUTPATIENT
Start: 2023-01-01 | End: 2023-01-01

## 2023-01-01 RX ORDER — MORPHINE SULFATE 4 MG/ML
3 INJECTION, SOLUTION INTRAMUSCULAR; INTRAVENOUS ONCE
Status: COMPLETED | OUTPATIENT
Start: 2023-01-01 | End: 2023-01-01

## 2023-01-01 RX ORDER — SODIUM CHLORIDE 0.9 % (FLUSH) 0.9 %
10 SYRINGE (ML) INJECTION EVERY 8 HOURS SCHEDULED
Status: DISCONTINUED | OUTPATIENT
Start: 2023-01-01 | End: 2023-01-01 | Stop reason: HOSPADM

## 2023-01-01 RX ORDER — LORAZEPAM 2 MG/ML
0.5 INJECTION INTRAMUSCULAR EVERY 4 HOURS PRN
Status: DISCONTINUED | OUTPATIENT
Start: 2023-01-01 | End: 2023-12-27 | Stop reason: HOSPADM

## 2023-01-01 RX ORDER — ATORVASTATIN CALCIUM 10 MG/1
10 TABLET, FILM COATED ORAL NIGHTLY
Status: DISCONTINUED | OUTPATIENT
Start: 2023-01-01 | End: 2023-01-01 | Stop reason: HOSPADM

## 2023-01-01 RX ORDER — METOPROLOL SUCCINATE 50 MG/1
1 TABLET, EXTENDED RELEASE ORAL DAILY
Status: ON HOLD | COMMUNITY
Start: 2022-04-18 | End: 2023-01-01 | Stop reason: ENTERED-IN-ERROR

## 2023-01-01 RX ORDER — MORPHINE SULFATE 4 MG/ML
4 INJECTION, SOLUTION INTRAMUSCULAR; INTRAVENOUS ONCE
Status: COMPLETED | OUTPATIENT
Start: 2023-01-01 | End: 2023-01-01

## 2023-01-01 RX ORDER — INSULIN LISPRO 100 [IU]/ML
0-5 INJECTION, SOLUTION INTRAVENOUS; SUBCUTANEOUS
Status: DISCONTINUED | OUTPATIENT
Start: 2023-01-01 | End: 2023-01-01

## 2023-01-01 RX ORDER — ATORVASTATIN CALCIUM 10 MG/1
10 TABLET, FILM COATED ORAL DAILY
COMMUNITY
End: 2023-12-27 | Stop reason: HOSPADM

## 2023-01-01 RX ORDER — FUROSEMIDE 80 MG/1
80 TABLET ORAL 2 TIMES DAILY
Status: DISCONTINUED | OUTPATIENT
Start: 2023-01-01 | End: 2023-01-01 | Stop reason: HOSPADM

## 2023-01-01 RX ORDER — VANCOMYCIN HYDROCHLORIDE 750 MG/150ML
750 INJECTION, SOLUTION INTRAVENOUS ONCE
Status: COMPLETED | OUTPATIENT
Start: 2023-01-01 | End: 2023-01-01

## 2023-01-01 RX ORDER — METFORMIN HYDROCHLORIDE 500 MG/1
1 TABLET ORAL 2 TIMES DAILY
Status: ON HOLD | COMMUNITY
Start: 2022-08-03 | End: 2023-01-01 | Stop reason: ENTERED-IN-ERROR

## 2023-01-01 RX ORDER — ASCORBIC ACID 500 MG
500 TABLET ORAL DAILY
COMMUNITY
End: 2023-12-27 | Stop reason: HOSPADM

## 2023-01-01 RX ORDER — ONDANSETRON 4 MG/1
4 TABLET, ORALLY DISINTEGRATING ORAL EVERY 8 HOURS PRN
Status: DISCONTINUED | OUTPATIENT
Start: 2023-01-01 | End: 2023-01-01 | Stop reason: HOSPADM

## 2023-01-01 RX ORDER — B-COMPLEX WITH VITAMIN C
1 TABLET ORAL DAILY
Status: DISCONTINUED | OUTPATIENT
Start: 2023-01-01 | End: 2023-01-01 | Stop reason: HOSPADM

## 2023-01-01 RX ORDER — MIDODRINE HYDROCHLORIDE 5 MG/1
5 TABLET ORAL 2 TIMES WEEKLY
COMMUNITY
End: 2023-01-01 | Stop reason: HOSPADM

## 2023-01-01 RX ORDER — AMLODIPINE BESYLATE 5 MG/1
5 TABLET ORAL
Status: ON HOLD | COMMUNITY
End: 2023-01-01 | Stop reason: ENTERED-IN-ERROR

## 2023-01-01 RX ORDER — ASCORBIC ACID 500 MG
500 TABLET ORAL DAILY
Status: DISCONTINUED | OUTPATIENT
Start: 2023-12-27 | End: 2023-01-01

## 2023-01-01 RX ORDER — CALCITRIOL 0.25 UG/1
0.25 CAPSULE ORAL DAILY
COMMUNITY
End: 2023-12-27 | Stop reason: HOSPADM

## 2023-01-01 RX ORDER — POLYETHYLENE GLYCOL 3350 17 G/17G
17 POWDER, FOR SOLUTION ORAL DAILY
Status: DISCONTINUED | OUTPATIENT
Start: 2023-01-01 | End: 2023-01-01

## 2023-01-01 RX ORDER — HEPARIN SODIUM 1000 [USP'U]/ML
1000 INJECTION, SOLUTION INTRAVENOUS; SUBCUTANEOUS
Status: DISCONTINUED | OUTPATIENT
Start: 2023-01-01 | End: 2023-01-01 | Stop reason: HOSPADM

## 2023-01-01 RX ORDER — HYDROCHLOROTHIAZIDE 25 MG/1
1 TABLET ORAL DAILY
Status: ON HOLD | COMMUNITY
Start: 2022-07-31 | End: 2023-01-01 | Stop reason: ENTERED-IN-ERROR

## 2023-01-01 RX ORDER — ATORVASTATIN CALCIUM 10 MG/1
10 TABLET, FILM COATED ORAL NIGHTLY
Status: DISCONTINUED | OUTPATIENT
Start: 2023-12-27 | End: 2023-01-01

## 2023-01-01 RX ORDER — ALBUMIN HUMAN 250 G/1000ML
25 SOLUTION INTRAVENOUS ONCE
Status: COMPLETED | OUTPATIENT
Start: 2023-01-01 | End: 2023-01-01

## 2023-01-01 RX ORDER — DEXTROSE 50 % IN WATER (D50W) INTRAVENOUS SYRINGE
25
Status: DISCONTINUED | OUTPATIENT
Start: 2023-01-01 | End: 2023-01-01

## 2023-01-01 RX ORDER — APIXABAN 5 MG/1
5 TABLET, FILM COATED ORAL EVERY 12 HOURS
COMMUNITY
End: 2023-12-27 | Stop reason: HOSPADM

## 2023-01-01 RX ORDER — ASPIRIN 81 MG/1
81 TABLET ORAL DAILY
COMMUNITY
End: 2023-12-27 | Stop reason: HOSPADM

## 2023-01-01 RX ORDER — FUROSEMIDE 40 MG/1
40 TABLET ORAL
Status: ON HOLD | COMMUNITY
Start: 2023-01-01 | End: 2023-01-01 | Stop reason: ENTERED-IN-ERROR

## 2023-01-01 RX ORDER — KETOROLAC TROMETHAMINE 15 MG/ML
15 INJECTION, SOLUTION INTRAMUSCULAR; INTRAVENOUS EVERY 6 HOURS PRN
Status: DISCONTINUED | OUTPATIENT
Start: 2023-01-01 | End: 2023-12-27 | Stop reason: HOSPADM

## 2023-01-01 RX ORDER — ONDANSETRON HYDROCHLORIDE 2 MG/ML
4 INJECTION, SOLUTION INTRAVENOUS EVERY 8 HOURS PRN
Status: DISCONTINUED | OUTPATIENT
Start: 2023-01-01 | End: 2023-01-01 | Stop reason: HOSPADM

## 2023-01-01 RX ORDER — LIDOCAINE HYDROCHLORIDE 10 MG/ML
INJECTION, SOLUTION EPIDURAL; INFILTRATION; INTRACAUDAL; PERINEURAL
Status: COMPLETED
Start: 2023-01-01 | End: 2023-01-01

## 2023-01-01 RX ORDER — MIDODRINE HYDROCHLORIDE 10 MG/1
10 TABLET ORAL 3 TIMES WEEKLY
Status: DISCONTINUED | OUTPATIENT
Start: 2023-01-01 | End: 2023-01-01 | Stop reason: HOSPADM

## 2023-01-01 RX ORDER — MIDODRINE HYDROCHLORIDE 10 MG/1
10 TABLET ORAL 3 TIMES WEEKLY
Qty: 12 TABLET | Refills: 0 | Status: SHIPPED | OUTPATIENT
Start: 2023-01-01 | End: 2023-12-27 | Stop reason: HOSPADM

## 2023-01-01 RX ORDER — POLYETHYLENE GLYCOL 3350 17 G/17G
17 POWDER, FOR SOLUTION ORAL DAILY PRN
Status: DISCONTINUED | OUTPATIENT
Start: 2023-01-01 | End: 2023-01-01 | Stop reason: HOSPADM

## 2023-01-01 RX ORDER — DEXTROSE MONOHYDRATE 100 MG/ML
0.3 INJECTION, SOLUTION INTRAVENOUS ONCE AS NEEDED
Status: DISCONTINUED | OUTPATIENT
Start: 2023-01-01 | End: 2023-01-01 | Stop reason: HOSPADM

## 2023-01-01 RX ORDER — CALCITRIOL 0.25 UG/1
0.25 CAPSULE ORAL DAILY
Status: DISCONTINUED | OUTPATIENT
Start: 2023-01-01 | End: 2023-01-01 | Stop reason: HOSPADM

## 2023-01-01 RX ORDER — GLYCOPYRROLATE 0.2 MG/ML
0.2 INJECTION INTRAMUSCULAR; INTRAVENOUS EVERY 4 HOURS PRN
Status: DISCONTINUED | OUTPATIENT
Start: 2023-01-01 | End: 2023-12-27 | Stop reason: HOSPADM

## 2023-01-01 RX ORDER — VANCOMYCIN HYDROCHLORIDE 1 G/20ML
INJECTION, POWDER, LYOPHILIZED, FOR SOLUTION INTRAVENOUS DAILY PRN
Status: DISCONTINUED | OUTPATIENT
Start: 2023-01-01 | End: 2023-01-01

## 2023-01-01 RX ORDER — FUROSEMIDE 20 MG/1
80 TABLET ORAL 2 TIMES DAILY
Status: DISCONTINUED | OUTPATIENT
Start: 2023-01-01 | End: 2023-01-01

## 2023-01-01 RX ORDER — MIDODRINE HYDROCHLORIDE 10 MG/1
10 TABLET ORAL 3 TIMES WEEKLY
COMMUNITY
End: 2023-12-27 | Stop reason: HOSPADM

## 2023-01-01 RX ORDER — FENTANYL CITRATE 50 UG/ML
25 INJECTION, SOLUTION INTRAMUSCULAR; INTRAVENOUS
Status: DISCONTINUED | OUTPATIENT
Start: 2023-01-01 | End: 2023-12-27 | Stop reason: HOSPADM

## 2023-01-01 RX ORDER — ASCORBIC ACID 500 MG
500 TABLET ORAL DAILY
Status: DISCONTINUED | OUTPATIENT
Start: 2023-01-01 | End: 2023-01-01 | Stop reason: HOSPADM

## 2023-01-01 RX ORDER — TALC
3 POWDER (GRAM) TOPICAL NIGHTLY PRN
Status: DISCONTINUED | OUTPATIENT
Start: 2023-01-01 | End: 2023-01-01 | Stop reason: HOSPADM

## 2023-01-01 RX ORDER — DEXTROSE MONOHYDRATE 100 MG/ML
0.3 INJECTION, SOLUTION INTRAVENOUS ONCE AS NEEDED
Status: DISCONTINUED | OUTPATIENT
Start: 2023-01-01 | End: 2023-01-01

## 2023-01-01 RX ORDER — LORAZEPAM 2 MG/ML
2 INJECTION INTRAMUSCULAR EVERY 10 MIN PRN
Status: DISCONTINUED | OUTPATIENT
Start: 2023-01-01 | End: 2023-12-27 | Stop reason: HOSPADM

## 2023-01-01 RX ORDER — ALBUTEROL SULFATE 0.83 MG/ML
2.5 SOLUTION RESPIRATORY (INHALATION) EVERY 4 HOURS PRN
Status: DISCONTINUED | OUTPATIENT
Start: 2023-01-01 | End: 2023-12-27 | Stop reason: HOSPADM

## 2023-01-01 RX ORDER — ACETAMINOPHEN 325 MG/1
650 TABLET ORAL EVERY 4 HOURS PRN
Status: DISCONTINUED | OUTPATIENT
Start: 2023-01-01 | End: 2023-01-01 | Stop reason: HOSPADM

## 2023-01-01 RX ORDER — VANCOMYCIN HYDROCHLORIDE 750 MG/150ML
1.5 INJECTION, SOLUTION INTRAVENOUS ONCE
Status: DISCONTINUED | OUTPATIENT
Start: 2023-01-01 | End: 2023-01-01

## 2023-01-01 RX ORDER — ACETAMINOPHEN 160 MG/5ML
650 SOLUTION ORAL EVERY 4 HOURS PRN
Status: DISCONTINUED | OUTPATIENT
Start: 2023-01-01 | End: 2023-01-01 | Stop reason: HOSPADM

## 2023-01-01 RX ORDER — ASPIRIN 81 MG/1
81 TABLET ORAL DAILY
Status: DISCONTINUED | OUTPATIENT
Start: 2023-01-01 | End: 2023-01-01 | Stop reason: HOSPADM

## 2023-01-01 RX ORDER — DOCUSATE SODIUM 100 MG/1
100 CAPSULE, LIQUID FILLED ORAL 2 TIMES DAILY
Status: DISCONTINUED | OUTPATIENT
Start: 2023-01-01 | End: 2023-01-01

## 2023-01-01 RX ORDER — TORSEMIDE 20 MG/1
TABLET ORAL
Status: ON HOLD | COMMUNITY
Start: 2023-01-01 | End: 2023-01-01 | Stop reason: ENTERED-IN-ERROR

## 2023-01-01 RX ORDER — NOREPINEPHRINE BITARTRATE/D5W 8 MG/250ML
.01-.5 PLASTIC BAG, INJECTION (ML) INTRAVENOUS CONTINUOUS
Status: DISCONTINUED | OUTPATIENT
Start: 2023-01-01 | End: 2023-01-01

## 2023-01-01 RX ORDER — OXYCODONE HYDROCHLORIDE 5 MG/1
2.5 TABLET ORAL ONCE
Status: COMPLETED | OUTPATIENT
Start: 2023-01-01 | End: 2023-01-01

## 2023-01-01 RX ORDER — ALLOPURINOL 100 MG/1
100 TABLET ORAL
Status: DISCONTINUED | OUTPATIENT
Start: 2023-01-01 | End: 2023-01-01

## 2023-01-01 RX ORDER — B-COMPLEX WITH VITAMIN C
1 TABLET ORAL DAILY
Status: DISCONTINUED | OUTPATIENT
Start: 2023-12-27 | End: 2023-01-01

## 2023-01-01 RX ORDER — INSULIN LISPRO 100 [IU]/ML
0-10 INJECTION, SOLUTION INTRAVENOUS; SUBCUTANEOUS
Status: DISCONTINUED | OUTPATIENT
Start: 2023-01-01 | End: 2023-01-01 | Stop reason: HOSPADM

## 2023-01-01 RX ORDER — SODIUM CHLORIDE, SODIUM LACTATE, POTASSIUM CHLORIDE, CALCIUM CHLORIDE 600; 310; 30; 20 MG/100ML; MG/100ML; MG/100ML; MG/100ML
100 INJECTION, SOLUTION INTRAVENOUS CONTINUOUS
Status: DISCONTINUED | OUTPATIENT
Start: 2023-01-01 | End: 2023-01-01

## 2023-01-01 RX ORDER — ALLOPURINOL 100 MG/1
100 TABLET ORAL
COMMUNITY
Start: 2023-01-01 | End: 2023-12-27 | Stop reason: HOSPADM

## 2023-01-01 RX ORDER — PANTOPRAZOLE SODIUM 40 MG/10ML
40 INJECTION, POWDER, LYOPHILIZED, FOR SOLUTION INTRAVENOUS DAILY
Status: DISCONTINUED | OUTPATIENT
Start: 2023-01-01 | End: 2023-01-01

## 2023-01-01 RX ORDER — FUROSEMIDE 80 MG/1
80 TABLET ORAL 2 TIMES DAILY
COMMUNITY
Start: 2023-01-01 | End: 2023-12-27 | Stop reason: HOSPADM

## 2023-01-01 RX ORDER — SPIRONOLACTONE 25 MG/1
1 TABLET ORAL DAILY
Status: ON HOLD | COMMUNITY
Start: 2022-02-08 | End: 2023-01-01 | Stop reason: ENTERED-IN-ERROR

## 2023-01-01 RX ADMIN — HYDROMORPHONE HYDROCHLORIDE 0.2 MG: 0.2 INJECTION, SOLUTION INTRAMUSCULAR; INTRAVENOUS; SUBCUTANEOUS at 23:36

## 2023-01-01 RX ADMIN — PANTOPRAZOLE SODIUM 40 MG: 40 INJECTION, POWDER, FOR SOLUTION INTRAVENOUS at 18:06

## 2023-01-01 RX ADMIN — Medication 10 ML: at 06:16

## 2023-01-01 RX ADMIN — MORPHINE SULFATE 3 MG: 4 INJECTION, SOLUTION INTRAMUSCULAR; INTRAVENOUS at 22:17

## 2023-01-01 RX ADMIN — HYDROCORTISONE SODIUM SUCCINATE 100 MG: 100 INJECTION, POWDER, FOR SOLUTION INTRAMUSCULAR; INTRAVENOUS at 20:55

## 2023-01-01 RX ADMIN — OXYCODONE HYDROCHLORIDE AND ACETAMINOPHEN 500 MG: 500 TABLET ORAL at 08:54

## 2023-01-01 RX ADMIN — Medication 10 ML: at 13:52

## 2023-01-01 RX ADMIN — PIPERACILLIN SODIUM AND TAZOBACTAM SODIUM 2.25 G: 2; .25 INJECTION, SOLUTION INTRAVENOUS at 06:42

## 2023-01-01 RX ADMIN — VANCOMYCIN HYDROCHLORIDE 1500 MG: 1.5 INJECTION, POWDER, LYOPHILIZED, FOR SOLUTION INTRAVENOUS at 14:35

## 2023-01-01 RX ADMIN — LORAZEPAM 2 MG: 2 INJECTION, SOLUTION INTRAMUSCULAR; INTRAVENOUS at 22:28

## 2023-01-01 RX ADMIN — OXYCODONE HYDROCHLORIDE AND ACETAMINOPHEN 500 MG: 500 TABLET ORAL at 11:25

## 2023-01-01 RX ADMIN — GLYCOPYRROLATE 0.2 MG: 0.2 INJECTION, SOLUTION INTRAMUSCULAR; INTRAVENOUS at 22:17

## 2023-01-01 RX ADMIN — HYDROMORPHONE HYDROCHLORIDE 0.2 MG: 0.2 INJECTION, SOLUTION INTRAMUSCULAR; INTRAVENOUS; SUBCUTANEOUS at 12:35

## 2023-01-01 RX ADMIN — INSULIN LISPRO 6 UNITS: 100 INJECTION, SOLUTION INTRAVENOUS; SUBCUTANEOUS at 13:29

## 2023-01-01 RX ADMIN — PIPERACILLIN SODIUM AND TAZOBACTAM SODIUM 2.25 G: 2; .25 INJECTION, SOLUTION INTRAVENOUS at 13:29

## 2023-01-01 RX ADMIN — NOREPINEPHRINE BITARTRATE 0.01 MCG: 8 INJECTION, SOLUTION INTRAVENOUS at 12:23

## 2023-01-01 RX ADMIN — FUROSEMIDE 80 MG: 80 TABLET ORAL at 08:54

## 2023-01-01 RX ADMIN — ACETAMINOPHEN 650 MG: 325 TABLET ORAL at 11:34

## 2023-01-01 RX ADMIN — MORPHINE SULFATE 4 MG: 4 INJECTION, SOLUTION INTRAMUSCULAR; INTRAVENOUS at 19:59

## 2023-01-01 RX ADMIN — SITAGLIPTIN 25 MG: 25 TABLET, FILM COATED ORAL at 08:55

## 2023-01-01 RX ADMIN — HEPARIN SODIUM 1000 UNITS: 1000 INJECTION, SOLUTION INTRAVENOUS; SUBCUTANEOUS at 18:00

## 2023-01-01 RX ADMIN — SODIUM CHLORIDE, POTASSIUM CHLORIDE, SODIUM LACTATE AND CALCIUM CHLORIDE 1000 ML: 600; 310; 30; 20 INJECTION, SOLUTION INTRAVENOUS at 18:46

## 2023-01-01 RX ADMIN — Medication 1 TABLET: at 08:54

## 2023-01-01 RX ADMIN — MIDODRINE HYDROCHLORIDE 10 MG: 10 TABLET ORAL at 14:29

## 2023-01-01 RX ADMIN — EPINEPHRINE 0.01 MCG/KG/MIN: 1 INJECTION INTRAMUSCULAR; INTRAVENOUS; SUBCUTANEOUS at 15:48

## 2023-01-01 RX ADMIN — Medication 10 ML: at 13:30

## 2023-01-01 RX ADMIN — APIXABAN 5 MG: 5 TABLET, FILM COATED ORAL at 21:01

## 2023-01-01 RX ADMIN — HYDROCORTISONE SODIUM SUCCINATE 50 MG: 100 INJECTION, POWDER, FOR SOLUTION INTRAMUSCULAR; INTRAVENOUS at 18:06

## 2023-01-01 RX ADMIN — CALCITRIOL CAPSULES 0.25 MCG 0.25 MCG: 0.25 CAPSULE ORAL at 11:25

## 2023-01-01 RX ADMIN — Medication 0.01 MCG: at 12:23

## 2023-01-01 RX ADMIN — PIPERACILLIN SODIUM AND TAZOBACTAM SODIUM 4.5 G: 4; .5 INJECTION, SOLUTION INTRAVENOUS at 19:59

## 2023-01-01 RX ADMIN — ASPIRIN 81 MG: 81 TABLET, COATED ORAL at 08:54

## 2023-01-01 RX ADMIN — Medication 1 CAPSULE: at 08:54

## 2023-01-01 RX ADMIN — ASPIRIN 81 MG: 81 TABLET, COATED ORAL at 11:25

## 2023-01-01 RX ADMIN — PIPERACILLIN SODIUM AND TAZOBACTAM SODIUM 2.25 G: 2; .25 INJECTION, SOLUTION INTRAVENOUS at 05:41

## 2023-01-01 RX ADMIN — PIPERACILLIN SODIUM AND TAZOBACTAM SODIUM 4.5 G: 4; .5 INJECTION, SOLUTION INTRAVENOUS at 11:11

## 2023-01-01 RX ADMIN — SITAGLIPTIN 25 MG: 25 TABLET, FILM COATED ORAL at 11:25

## 2023-01-01 RX ADMIN — INSULIN LISPRO 4 UNITS: 100 INJECTION, SOLUTION INTRAVENOUS; SUBCUTANEOUS at 11:25

## 2023-01-01 RX ADMIN — PIPERACILLIN SODIUM AND TAZOBACTAM SODIUM 2.25 G: 2; .25 INJECTION, SOLUTION INTRAVENOUS at 22:17

## 2023-01-01 RX ADMIN — INSULIN LISPRO 4 UNITS: 100 INJECTION, SOLUTION INTRAVENOUS; SUBCUTANEOUS at 09:37

## 2023-01-01 RX ADMIN — PIPERACILLIN SODIUM AND TAZOBACTAM SODIUM 2.25 G: 2; .25 INJECTION, SOLUTION INTRAVENOUS at 13:30

## 2023-01-01 RX ADMIN — VASOPRESSIN 0.03 UNITS/MIN: 0.2 INJECTION INTRAVENOUS at 18:21

## 2023-01-01 RX ADMIN — ACETAMINOPHEN 650 MG: 325 TABLET ORAL at 08:54

## 2023-01-01 RX ADMIN — HYDROMORPHONE HYDROCHLORIDE 0.2 MG: 0.2 INJECTION, SOLUTION INTRAMUSCULAR; INTRAVENOUS; SUBCUTANEOUS at 15:45

## 2023-01-01 RX ADMIN — Medication 1 MG/HR: at 22:15

## 2023-01-01 RX ADMIN — LIDOCAINE HYDROCHLORIDE: 10 INJECTION, SOLUTION EPIDURAL; INFILTRATION; INTRACAUDAL; PERINEURAL at 13:40

## 2023-01-01 RX ADMIN — VANCOMYCIN HYDROCHLORIDE 750 MG: 750 INJECTION, SOLUTION INTRAVENOUS at 20:20

## 2023-01-01 RX ADMIN — Medication 10 ML: at 06:43

## 2023-01-01 RX ADMIN — ALBUMIN HUMAN 25 G: 0.25 SOLUTION INTRAVENOUS at 15:30

## 2023-01-01 RX ADMIN — ACETAMINOPHEN 650 MG: 325 TABLET ORAL at 21:01

## 2023-01-01 RX ADMIN — DEXTROSE MONOHYDRATE 25 G: 25 INJECTION, SOLUTION INTRAVENOUS at 21:17

## 2023-01-01 RX ADMIN — OXYCODONE HYDROCHLORIDE 2.5 MG: 5 TABLET ORAL at 04:13

## 2023-01-01 RX ADMIN — SODIUM CHLORIDE, POTASSIUM CHLORIDE, SODIUM LACTATE AND CALCIUM CHLORIDE 100 ML/HR: 600; 310; 30; 20 INJECTION, SOLUTION INTRAVENOUS at 20:56

## 2023-01-01 RX ADMIN — CALCITRIOL CAPSULES 0.25 MCG 0.25 MCG: 0.25 CAPSULE ORAL at 08:54

## 2023-01-01 RX ADMIN — APIXABAN 2.5 MG: 2.5 TABLET, FILM COATED ORAL at 09:38

## 2023-01-01 RX ADMIN — APIXABAN 5 MG: 5 TABLET, FILM COATED ORAL at 08:55

## 2023-01-01 RX ADMIN — SODIUM CHLORIDE, POTASSIUM CHLORIDE, SODIUM LACTATE AND CALCIUM CHLORIDE 1000 ML: 600; 310; 30; 20 INJECTION, SOLUTION INTRAVENOUS at 15:48

## 2023-01-01 RX ADMIN — SODIUM CHLORIDE 500 ML: 9 INJECTION, SOLUTION INTRAVENOUS at 14:36

## 2023-01-01 RX ADMIN — Medication 10 ML: at 22:16

## 2023-01-01 RX ADMIN — ATORVASTATIN CALCIUM 10 MG: 10 TABLET, FILM COATED ORAL at 21:01

## 2023-01-01 RX ADMIN — Medication 1 CAPSULE: at 09:38

## 2023-01-01 RX ADMIN — SODIUM CHLORIDE 500 ML: 9 INJECTION, SOLUTION INTRAVENOUS at 11:11

## 2023-01-01 SDOH — SOCIAL STABILITY: SOCIAL INSECURITY: WERE YOU ABLE TO COMPLETE ALL THE BEHAVIORAL HEALTH SCREENINGS?: YES

## 2023-01-01 SDOH — SOCIAL STABILITY: SOCIAL INSECURITY: HAVE YOU HAD THOUGHTS OF HARMING ANYONE ELSE?: YES

## 2023-01-01 SDOH — SOCIAL STABILITY: SOCIAL INSECURITY: ARE THERE ANY APPARENT SIGNS OF INJURIES/BEHAVIORS THAT COULD BE RELATED TO ABUSE/NEGLECT?: NO

## 2023-01-01 SDOH — SOCIAL STABILITY: SOCIAL INSECURITY: DO YOU FEEL UNSAFE GOING BACK TO THE PLACE WHERE YOU ARE LIVING?: NO

## 2023-01-01 SDOH — SOCIAL STABILITY: SOCIAL INSECURITY: ARE YOU OR HAVE YOU BEEN THREATENED OR ABUSED PHYSICALLY, EMOTIONALLY, OR SEXUALLY BY ANYONE?: NO

## 2023-01-01 SDOH — SOCIAL STABILITY: SOCIAL INSECURITY: DO YOU FEEL ANYONE HAS EXPLOITED OR TAKEN ADVANTAGE OF YOU FINANCIALLY OR OF YOUR PERSONAL PROPERTY?: NO

## 2023-01-01 SDOH — SOCIAL STABILITY: SOCIAL INSECURITY: HAS ANYONE EVER THREATENED TO HURT YOUR FAMILY OR YOUR PETS?: NO

## 2023-01-01 SDOH — SOCIAL STABILITY: SOCIAL INSECURITY: ABUSE: ADULT

## 2023-01-01 SDOH — SOCIAL STABILITY: SOCIAL INSECURITY: DOES ANYONE TRY TO KEEP YOU FROM HAVING/CONTACTING OTHER FRIENDS OR DOING THINGS OUTSIDE YOUR HOME?: NO

## 2023-01-01 SDOH — SOCIAL STABILITY: SOCIAL INSECURITY: HAVE YOU HAD THOUGHTS OF HARMING ANYONE ELSE?: NO

## 2023-01-01 ASSESSMENT — ACTIVITIES OF DAILY LIVING (ADL)
HEARING - RIGHT EAR: HEARING AID
HEARING - LEFT EAR: HEARING AID
FEEDING YOURSELF: INDEPENDENT
PATIENT'S MEMORY ADEQUATE TO SAFELY COMPLETE DAILY ACTIVITIES?: YES
GROOMING: INDEPENDENT
TOILETING: INDEPENDENT
ADEQUATE_TO_COMPLETE_ADL: UNABLE TO ASSESS
HEARING - LEFT EAR: FUNCTIONAL
HEARING - RIGHT EAR: FUNCTIONAL
PATIENT'S MEMORY ADEQUATE TO SAFELY COMPLETE DAILY ACTIVITIES?: UNABLE TO ASSESS
JUDGMENT_ADEQUATE_SAFELY_COMPLETE_DAILY_ACTIVITIES: YES
DRESSING YOURSELF: INDEPENDENT
LACK_OF_TRANSPORTATION: NO
WALKS IN HOME: INDEPENDENT
WALKS IN HOME: DEPENDENT
FEEDING YOURSELF: NEEDS ASSISTANCE
GROOMING: NEEDS ASSISTANCE
TOILETING: NEEDS ASSISTANCE
BATHING_ASSISTANCE: MODERATE
DRESSING YOURSELF: NEEDS ASSISTANCE
JUDGMENT_ADEQUATE_SAFELY_COMPLETE_DAILY_ACTIVITIES: UNABLE TO ASSESS
BATHING: INDEPENDENT
LACK_OF_TRANSPORTATION: PATIENT UNABLE TO ANSWER
BATHING: NEEDS ASSISTANCE
ADEQUATE_TO_COMPLETE_ADL: YES

## 2023-01-01 ASSESSMENT — PAIN SCALES - GENERAL
PAINLEVEL_OUTOF10: 0 - NO PAIN
PAINLEVEL_OUTOF10: 0 - NO PAIN
PAINLEVEL_OUTOF10: 4
PAINLEVEL_OUTOF10: 0 - NO PAIN
PAINLEVEL_OUTOF10: 8
PAINLEVEL_OUTOF10: 5 - MODERATE PAIN
PAINLEVEL_OUTOF10: 0 - NO PAIN
PAINLEVEL_OUTOF10: 2
PAINLEVEL_OUTOF10: 7
PAINLEVEL_OUTOF10: 7
PAINLEVEL_OUTOF10: 0 - NO PAIN
PAINLEVEL_OUTOF10: 8
PAINLEVEL_OUTOF10: 3
PAINLEVEL_OUTOF10: 8

## 2023-01-01 ASSESSMENT — LIFESTYLE VARIABLES
HOW MANY STANDARD DRINKS CONTAINING ALCOHOL DO YOU HAVE ON A TYPICAL DAY: PATIENT DOES NOT DRINK
SKIP TO QUESTIONS 9-10: 1
REASON UNABLE TO ASSESS: NO
AUDIT-C TOTAL SCORE: 0
HOW OFTEN DO YOU HAVE A DRINK CONTAINING ALCOHOL: NEVER
EVER FELT BAD OR GUILTY ABOUT YOUR DRINKING: NO
HOW OFTEN DO YOU HAVE 6 OR MORE DRINKS ON ONE OCCASION: NEVER
HAVE PEOPLE ANNOYED YOU BY CRITICIZING YOUR DRINKING: NO
HOW MANY STANDARD DRINKS CONTAINING ALCOHOL DO YOU HAVE ON A TYPICAL DAY: PATIENT DOES NOT DRINK
AUDIT-C TOTAL SCORE: 0
HOW MANY STANDARD DRINKS CONTAINING ALCOHOL DO YOU HAVE ON A TYPICAL DAY: PATIENT DOES NOT DRINK
SKIP TO QUESTIONS 9-10: 1
HOW OFTEN DO YOU HAVE 6 OR MORE DRINKS ON ONE OCCASION: NEVER
AUDIT-C TOTAL SCORE: 0
HOW OFTEN DO YOU HAVE A DRINK CONTAINING ALCOHOL: NEVER
EVER HAD A DRINK FIRST THING IN THE MORNING TO STEADY YOUR NERVES TO GET RID OF A HANGOVER: NO
EVER HAD A DRINK FIRST THING IN THE MORNING TO STEADY YOUR NERVES TO GET RID OF A HANGOVER: NO
REASON UNABLE TO ASSESS: NO
AUDIT-C TOTAL SCORE: 0
HAVE YOU EVER FELT YOU SHOULD CUT DOWN ON YOUR DRINKING: NO
HAVE PEOPLE ANNOYED YOU BY CRITICIZING YOUR DRINKING: NO
EVER FELT BAD OR GUILTY ABOUT YOUR DRINKING: NO
SKIP TO QUESTIONS 9-10: 1
HOW OFTEN DO YOU HAVE 6 OR MORE DRINKS ON ONE OCCASION: NEVER
HOW OFTEN DO YOU HAVE A DRINK CONTAINING ALCOHOL: NEVER
HAVE YOU EVER FELT YOU SHOULD CUT DOWN ON YOUR DRINKING: NO
AUDIT-C TOTAL SCORE: 0
AUDIT-C TOTAL SCORE: 0

## 2023-01-01 ASSESSMENT — COGNITIVE AND FUNCTIONAL STATUS - GENERAL
CLIMB 3 TO 5 STEPS WITH RAILING: A LITTLE
MOVING FROM LYING ON BACK TO SITTING ON SIDE OF FLAT BED WITH BEDRAILS: A LITTLE
MOBILITY SCORE: 22
TOILETING: A LOT
CLIMB 3 TO 5 STEPS WITH RAILING: A LOT
TURNING FROM BACK TO SIDE WHILE IN FLAT BAD: A LITTLE
DRESSING REGULAR LOWER BODY CLOTHING: A LOT
DAILY ACTIVITIY SCORE: 23
CLIMB 3 TO 5 STEPS WITH RAILING: TOTAL
HELP NEEDED FOR BATHING: TOTAL
WALKING IN HOSPITAL ROOM: A LITTLE
WALKING IN HOSPITAL ROOM: A LOT
PERSONAL GROOMING: A LITTLE
TOILETING: A LITTLE
TURNING FROM BACK TO SIDE WHILE IN FLAT BAD: A LITTLE
DRESSING REGULAR UPPER BODY CLOTHING: A LITTLE
TURNING FROM BACK TO SIDE WHILE IN FLAT BAD: A LITTLE
MOVING FROM LYING ON BACK TO SITTING ON SIDE OF FLAT BED WITH BEDRAILS: A LITTLE
MOVING FROM LYING ON BACK TO SITTING ON SIDE OF FLAT BED WITH BEDRAILS: A LOT
DRESSING REGULAR LOWER BODY CLOTHING: TOTAL
STANDING UP FROM CHAIR USING ARMS: A LITTLE
DRESSING REGULAR UPPER BODY CLOTHING: A LITTLE
TURNING FROM BACK TO SIDE WHILE IN FLAT BAD: TOTAL
PERSONAL GROOMING: A LOT
MOBILITY SCORE: 20
DAILY ACTIVITIY SCORE: 9
WALKING IN HOSPITAL ROOM: A LITTLE
EATING MEALS: A LOT
STANDING UP FROM CHAIR USING ARMS: A LITTLE
STANDING UP FROM CHAIR USING ARMS: A LITTLE
DAILY ACTIVITIY SCORE: 16
TOILETING: A LITTLE
DAILY ACTIVITIY SCORE: 19
HELP NEEDED FOR BATHING: A LITTLE
STANDING UP FROM CHAIR USING ARMS: A LITTLE
PATIENT BASELINE BEDBOUND: NO
CLIMB 3 TO 5 STEPS WITH RAILING: A LITTLE
MOVING TO AND FROM BED TO CHAIR: A LITTLE
MOVING TO AND FROM BED TO CHAIR: A LITTLE
WALKING IN HOSPITAL ROOM: A LITTLE
PERSONAL GROOMING: A LITTLE
DAILY ACTIVITIY SCORE: 19
MOBILITY SCORE: 22
HELP NEEDED FOR BATHING: A LOT
MOBILITY SCORE: 7
MOVING TO AND FROM BED TO CHAIR: A LITTLE
DRESSING REGULAR UPPER BODY CLOTHING: A LITTLE
PATIENT BASELINE BEDBOUND: NO
WALKING IN HOSPITAL ROOM: A LITTLE
CLIMB 3 TO 5 STEPS WITH RAILING: A LOT
MOBILITY SCORE: 16
MOVING TO AND FROM BED TO CHAIR: TOTAL
WALKING IN HOSPITAL ROOM: A LITTLE
CLIMB 3 TO 5 STEPS WITH RAILING: A LOT
DAILY ACTIVITIY SCORE: 23
TOILETING: A LITTLE
MOBILITY SCORE: 18
TOILETING: TOTAL
DRESSING REGULAR LOWER BODY CLOTHING: A LITTLE
MOBILITY SCORE: 17
HELP NEEDED FOR BATHING: A LITTLE
PERSONAL GROOMING: A LITTLE
STANDING UP FROM CHAIR USING ARMS: TOTAL
CLIMB 3 TO 5 STEPS WITH RAILING: A LITTLE
DRESSING REGULAR LOWER BODY CLOTHING: A LITTLE
WALKING IN HOSPITAL ROOM: TOTAL
TOILETING: A LITTLE
DRESSING REGULAR UPPER BODY CLOTHING: A LOT
MOVING FROM LYING ON BACK TO SITTING ON SIDE OF FLAT BED WITH BEDRAILS: A LITTLE

## 2023-01-01 ASSESSMENT — PAIN - FUNCTIONAL ASSESSMENT
PAIN_FUNCTIONAL_ASSESSMENT: 0-10
PAIN_FUNCTIONAL_ASSESSMENT: CPOT (CRITICAL CARE PAIN OBSERVATION TOOL)
PAIN_FUNCTIONAL_ASSESSMENT: 0-10

## 2023-01-01 ASSESSMENT — PATIENT HEALTH QUESTIONNAIRE - PHQ9
2. FEELING DOWN, DEPRESSED OR HOPELESS: NOT AT ALL
SUM OF ALL RESPONSES TO PHQ9 QUESTIONS 1 & 2: 0
SUM OF ALL RESPONSES TO PHQ9 QUESTIONS 1 & 2: 0
1. LITTLE INTEREST OR PLEASURE IN DOING THINGS: NOT AT ALL
2. FEELING DOWN, DEPRESSED OR HOPELESS: NOT AT ALL
SUM OF ALL RESPONSES TO PHQ9 QUESTIONS 1 & 2: 0
2. FEELING DOWN, DEPRESSED OR HOPELESS: NOT AT ALL

## 2023-01-01 ASSESSMENT — PAIN DESCRIPTION - LOCATION
LOCATION: LEG

## 2023-01-01 ASSESSMENT — ENCOUNTER SYMPTOMS
COUGH: 0
TROUBLE SWALLOWING: 0
NECK PAIN: 0
VOMITING: 0
CHEST TIGHTNESS: 0
CHILLS: 0
SHORTNESS OF BREATH: 0
FEVER: 0
DYSURIA: 0
NAUSEA: 0
CONFUSION: 0
NECK STIFFNESS: 0
WOUND: 1
HEADACHES: 0
HEMATURIA: 0
SORE THROAT: 0
DIZZINESS: 0
ABDOMINAL PAIN: 0
PALPITATIONS: 0
FLANK PAIN: 0

## 2023-01-01 ASSESSMENT — PAIN DESCRIPTION - DESCRIPTORS: DESCRIPTORS: ACHING

## 2023-01-01 ASSESSMENT — COLUMBIA-SUICIDE SEVERITY RATING SCALE - C-SSRS
1. IN THE PAST MONTH, HAVE YOU WISHED YOU WERE DEAD OR WISHED YOU COULD GO TO SLEEP AND NOT WAKE UP?: NO
2. HAVE YOU ACTUALLY HAD ANY THOUGHTS OF KILLING YOURSELF?: NO
1. IN THE PAST MONTH, HAVE YOU WISHED YOU WERE DEAD OR WISHED YOU COULD GO TO SLEEP AND NOT WAKE UP?: NO
6. HAVE YOU EVER DONE ANYTHING, STARTED TO DO ANYTHING, OR PREPARED TO DO ANYTHING TO END YOUR LIFE?: NO
2. HAVE YOU ACTUALLY HAD ANY THOUGHTS OF KILLING YOURSELF?: NO
6. HAVE YOU EVER DONE ANYTHING, STARTED TO DO ANYTHING, OR PREPARED TO DO ANYTHING TO END YOUR LIFE?: NO

## 2023-01-01 ASSESSMENT — PAIN DESCRIPTION - ORIENTATION
ORIENTATION: RIGHT
ORIENTATION: RIGHT
ORIENTATION: RIGHT;LEFT;LOWER
ORIENTATION: RIGHT
ORIENTATION: RIGHT

## 2023-01-01 ASSESSMENT — PAIN DESCRIPTION - PAIN TYPE: TYPE: ACUTE PAIN

## 2023-11-19 NOTE — Clinical Note
ED UM Review Complete - Patient Meets Inpatient Criteria  @RE[facility] HospitalUSER@

## 2023-11-19 NOTE — ED PROVIDER NOTES
EMERGENCY DEPARTMENT ENCOUNTER      Pt Name: Kiara Doss  MRN: [id number]  Birthdate 1935  Date of evaluation: 11/19/2023  Provider: Bonifacio Madrid MD    CHIEF COMPLAINT       Chief Complaint   Patient presents with    Leg Pain     BLE weaping and pain           HISTORY OF PRESENT ILLNESS    88-year-old female with a history of ESRD on HD T/Sat, NIDDM 2, DVT on Eliquis, CHF, and hypertension presenting to [facility] ED with complaint of right lower leg pain and weeping of legs bilaterally.  Patient reports both her lower legs have been weeping for the past 3-4 days then noticed this morning the outside of her right calf has a bruise and is painful at a 9/10.  Denies trauma to the area.  Also notes she feels shortness of breath and states she always feels cold.  Reports she received dialysis yesterday via a port on her right chest.  Nephrologist is Dr. Amadeo Sanchez. Denies fevers, nausea, palpitations, chest pain, urinary symptoms, or diarrhea.      History provided by:  Patient      Nursing Notes were reviewed.    PAST MEDICAL HISTORY     Past Medical History:   Diagnosis Date    Basal cell carcinoma of skin of lip 06/01/2014    Basal cell carcinoma, lip         SURGICAL HISTORY       Past Surgical History:   Procedure Laterality Date    BACK SURGERY  03/25/2014    Lower Back Surgery    IR CVC TUNNELED  6/30/2023    IR CVC TUNNELED 6/30/2023 [facility]IO    TONSILLECTOMY  03/25/2014    Tonsillectomy         CURRENT MEDICATIONS       Previous Medications    ALLOPURINOL (ZYLOPRIM) 100 MG TABLET    Take 1 tablet (100 mg) by mouth once daily.    AMLODIPINE (NORVASC) 5 MG TABLET    Take 1 tablet (5 mg) by mouth once daily with a meal.    APIXABAN (ELIQUIS) 2.5 MG TABLET    1 tablet (2.5 mg).    ATORVASTATIN (LIPITOR) 10 MG TABLET    Take 1 tablet (10 mg) by mouth once daily.    ELIQUIS 5 MG TABLET    Take 1 tablet (5 mg) by mouth every 12 hours.    FUROSEMIDE (LASIX) 40 MG TABLET    1 tablet (40 mg).    FUROSEMIDE (LASIX)  80 MG TABLET    Take 1 tablet (80 mg) by mouth 2 times a day.    HYDROCHLOROTHIAZIDE (HYDRODIURIL) 25 MG TABLET    Take 1 tablet (25 mg) by mouth once daily.    JANUVIA 25 MG TABLET    Take 1 tablet (25 mg) by mouth once daily.    LOSARTAN (COZAAR) 50 MG TABLET    Take 1 tablet (50 mg) by mouth once daily.    METFORMIN (GLUCOPHAGE) 500 MG TABLET    Take 1 tablet (500 mg) by mouth 2 times a day.    METOPROLOL SUCCINATE XL (TOPROL-XL) 50 MG 24 HR TABLET    Take 1 tablet (50 mg) by mouth once daily.    SPIRONOLACTONE (ALDACTONE) 25 MG TABLET    Take 1 tablet (25 mg) by mouth once daily.    TORSEMIDE (DEMADEX) 20 MG TABLET    TAKE 1 TABLET BY MOUTH BEFORE breakfast       ALLERGIES     Lisinopril, Raloxifene, and Statins-hmg-coa reductase inhibitors    FAMILY HISTORY     No family history on file.       SOCIAL HISTORY       Social History     Socioeconomic History    Marital status:      Spouse name: None    Number of children: None    Years of education: None    Highest education level: None   Occupational History    None   Tobacco Use    Smoking status: Never    Smokeless tobacco: Never   Substance and Sexual Activity    Alcohol use: None    Drug use: None    Sexual activity: None   Other Topics Concern    None   Social History Narrative    None     Social Determinants of Health     Financial Resource Strain: Not on file   Food Insecurity: Not on file   Transportation Needs: Not on file   Physical Activity: Not on file   Stress: Not on file   Social Connections: Not on file   Intimate Partner Violence: Not on file   Housing Stability: Not on file       SCREENINGS                        PHYSICAL EXAM    (up to 7 for level 4, 8 or more for level 5)     ED Triage Vitals [11/19/23 1804]   Temp Heart Rate Resp BP   36.8 °C (98.2 °F) 90 16 111/67      SpO2 Temp Source Heart Rate Source Patient Position   96 % Temporal Monitor Sitting      BP Location FiO2 (%)     Right arm --       Physical Exam  Vitals and nursing  note reviewed.   Constitutional:       General: She is awake. She is not in acute distress.     Appearance: Normal appearance. She is well-developed.   HENT:      Head: Normocephalic and atraumatic.      Right Ear: External ear normal.      Left Ear: External ear normal.      Nose: Nose normal. No congestion or rhinorrhea.      Mouth/Throat:      Mouth: Mucous membranes are moist.      Pharynx: Oropharynx is clear. No posterior oropharyngeal erythema.   Eyes:      General: No scleral icterus.        Right eye: No discharge.         Left eye: No discharge.      Extraocular Movements: Extraocular movements intact.      Conjunctiva/sclera: Conjunctivae normal.   Cardiovascular:      Rate and Rhythm: Regular rhythm. Tachycardia present.      Pulses: Normal pulses.      Heart sounds: Normal heart sounds. No murmur heard.     No friction rub.   Pulmonary:      Effort: Tachypnea present. No respiratory distress.      Breath sounds: Normal breath sounds. No stridor. No wheezing or rales.   Abdominal:      General: There is no distension.      Palpations: Abdomen is soft.      Tenderness: There is no abdominal tenderness. There is no right CVA tenderness, left CVA tenderness, guarding or rebound.   Musculoskeletal:         General: No swelling or tenderness.      Cervical back: Normal range of motion and neck supple.      Right lower leg: No edema.      Left lower leg: No edema.   Skin:     General: Skin is warm and dry.      Capillary Refill: Capillary refill takes less than 2 seconds.      Coloration: Skin is not jaundiced or pale.      Findings: No lesion or rash.   Neurological:      General: No focal deficit present.      Mental Status: She is alert and oriented to person, place, and time. Mental status is at baseline.      Cranial Nerves: No cranial nerve deficit.      Sensory: No sensory deficit.      Motor: No weakness.   Psychiatric:         Mood and Affect: Mood normal.         Behavior: Behavior normal. Behavior  is cooperative.         Thought Content: Thought content normal.         Judgment: Judgment normal.          DIAGNOSTIC RESULTS     LABS:  Labs Reviewed   BLOOD CULTURE   BLOOD CULTURE   CBC WITH AUTO DIFFERENTIAL   COMPREHENSIVE METABOLIC PANEL   LACTATE   URINALYSIS WITH REFLEX MICROSCOPIC AND CULTURE   TROPONIN I, HIGH SENSITIVITY       All other labs were within normal range or not returned as of this dictation.    Imaging  XR chest 1 view    (Results Pending)        Procedures  Procedures     EMERGENCY DEPARTMENT COURSE/MDM:     ED Course as of 11/20/23 0152   [place] Nov 19, 2023   2300 Patient's nephrologist, Dr. Amadeo Sanchez on consult. Accepted for admission. [ES]      ED Course User Index  [ES] Bonifacio Madrid MD         Diagnoses as of 11/20/23 0152   Atrial fibrillation, unspecified type (CMS/HCC)   Sepsis due to cellulitis (CMS/HCC)        Medical Decision Making  88-year-old female with a history mention above presenting to the ED with complaint of right lower leg pain and weeping of her legs bilaterally.    Patient is afebrile, hemodynamically stable on room air, and in no acute distress.  Physical exam findings mentioned above.  CBC, CMP, lactate, PT/INR, UA, troponins, EKG, blood cultures, x-ray of the right tib/fib, and CXR ordered.  Morphine, vancomycin, and Zosyn provided.  Fluids held due to concern for fluid overload.      EKG Interpretation 1905: Rate: 100. Rhythm: A-fib.  LAD.  No delta-wave, WESTON, deep TWI or biphasic t-waves in V1-V5, or LBBB. QTc: not prolonged at 330 ms.    Sepsis-screening criteria    Possible source: Cellulitis  SIRS criteria:  [] Temp >38.0C  [] Temp <36C  X HR >90  X RR >20  X WBC >12,000  [] WBC<4,000  [] >10% bands  ----  Organ Dysfunction (any 1)  [] SBP <90, or drop by 40mm Hg from baseline  [] MAP <65  X Creatinine >2  [] UO< 0.5 ml/kg/hr/2hr  [] Bilirubin >2  X INR> 1.5  [] aPTT >60sec  X Lactate >2  [] Resp Failure  [] Altered mental status/CNS dysfunction  ----  Within 3  hours  X Lactate level, 2nd lactic acid ordered  X Blood cx prior to Antibx  X Antibiotics, Broad spectrum Initiated  [] Fluids 30cc/kg  ----  Severe Sepsis X Time: 2100  (Patient met sepsis criteria with sign of end organ dysfunction)  ----    Tissue Reassessment: Time 2125  X I have reassessed the patient´s hemodynamic status or tissue perfusion.  This was via a repeat assessment of patient's vital signs, skin perfusion, capillary refill, pulses, and mental status.    Used order set: yes X     no [ ] - why?    Labs confirm lactic acidosis at 3.4 with elevated creatinine in the setting of ESRD and elevated troponin at 67 in which a repeat ordered.  INR notably elevated 2.7.  Patient reports pain reduced from a 7/10 to a 5/10 after morphine in which 0.2 mg Dilaudid provided.  States she feels well otherwise.  Patient's nephrologist, Dr. Amadeo Sanchez, placed on consult.    Disposition: Discussed with patient agreed with admission.        Patient and or family in agreement and understanding of treatment plan.  All questions answered.      I reviewed the case with the attending ED physician. The attending ED physician agrees with the plan. Patient and/or patient´s representative was counseled regarding labs, imaging, likely diagnosis, and plan. All questions were answered.    ED Medications administered this visit:    Medications   piperacillin-tazobactam-dextrose (Zosyn) IV 4.5 g (has no administration in time range)   vancomycin (Vancocin) in sodium chloride 0.9% 500 mL IV 1.5 g (has no administration in time range)       New Prescriptions from this visit:    New Prescriptions    No medications on file       Follow-up:  No follow-up provider specified.      Final Impression: No diagnosis found.      (Please note that portions of this note were completed with a voice recognition program.  Efforts were made to edit the dictations but occasionally words are mis-transcribed.)     Bonifacio Madrid MD  Resident  11/20/23 0153

## 2023-11-20 PROBLEM — A41.9 SEPSIS DUE TO CELLULITIS (MULTI): Status: ACTIVE | Noted: 2023-01-01

## 2023-11-20 PROBLEM — R73.9 HYPERGLYCEMIA: Status: ACTIVE | Noted: 2023-01-01

## 2023-11-20 PROBLEM — A41.9 SEVERE SEPSIS WITH ACUTE ORGAN DYSFUNCTION (MULTI): Status: ACTIVE | Noted: 2023-01-01

## 2023-11-20 PROBLEM — S81.801A LEG WOUND, RIGHT, INITIAL ENCOUNTER: Status: ACTIVE | Noted: 2023-01-01

## 2023-11-20 PROBLEM — R65.20 SEVERE SEPSIS WITH ACUTE ORGAN DYSFUNCTION (MULTI): Status: ACTIVE | Noted: 2023-01-01

## 2023-11-20 PROBLEM — L03.90 SEPSIS DUE TO CELLULITIS (MULTI): Status: ACTIVE | Noted: 2023-01-01

## 2023-11-20 PROBLEM — R79.89 ELEVATED TROPONIN: Status: ACTIVE | Noted: 2023-01-01

## 2023-11-20 NOTE — PROGRESS NOTES
Kiara Doss is a 88 y.o. female on day 0 of admission presenting with Severe sepsis with acute organ dysfunction (CMS/Formerly [facility]).      Subjective      88 y.o. female with medical history significant for remote DVT currently on DOAC, end-stage renal disease with Tuesday Saturday dialysis via a right permcath, heart failure with reduced ejection fraction, noninsulin-dependent type 2 diabetes, mitral valve regurgitation with murmur, and known first-degree AV block presenting to [facility] on 11/19/2023 with complaint of leg pain.     Patient is well known to renal service     Medication Documentation Review Audit       Reviewed by Shaina Godinez RN (Registered Nurse) on 11/20/23 at 0328      Medication Order Taking? Sig Documenting Provider Last Dose Status   allopurinol (Zyloprim) 100 mg tablet [id number] Yes Take 1 tablet (100 mg) by mouth once daily. Hanna Smith MD 11/19/2023 Active   amLODIPine (Norvasc) 5 mg tablet [id number] No Take 1 tablet (5 mg) by mouth once daily with a meal. Hanna Smith MD More than a month Active   apixaban (Eliquis) 2.5 mg tablet [id number] Yes 1 tablet (2.5 mg). Hanna Smith MD 11/19/2023 0900 Active   atorvastatin (Lipitor) 10 mg tablet [id number] No Take 1 tablet (10 mg) by mouth once daily. Hanna Smith MD 11/19/2023 Active   Eliquis 5 mg tablet [id number] No Take 1 tablet (5 mg) by mouth every 12 hours. Hanna Smith MD 11/19/2023 0900 Active   furosemide (Lasix) 40 mg tablet [id number] Yes 1 tablet (40 mg). Hanna Smith MD 11/19/2023 0900 Active   furosemide (Lasix) 80 mg tablet [id number] Yes Take 1 tablet (80 mg) by mouth 2 times a day. Hanna Smith MD More than a month Active   hydroCHLOROthiazide (HYDRODiuril) 25 mg tablet [id number] Yes Take 1 tablet (25 mg) by mouth once daily. Hanna Smith MD More than a month Active   Januvia 25 mg tablet [id number] No Take 1 tablet (25 mg) by mouth once daily. Hanna Smith MD  11/19/2023 0900 Active   losartan (Cozaar) 50 mg tablet [id number] No Take 1 tablet (50 mg) by mouth once daily. Historical Provider, MD More than a month Active   metFORMIN (Glucophage) 500 mg tablet [id number] Yes Take 1 tablet (500 mg) by mouth 2 times a day. Historical Provider, MD 11/19/2023 0900 Active   metoprolol succinate XL (Toprol-XL) 50 mg 24 hr tablet [id number] Yes Take 1 tablet (50 mg) by mouth once daily. Historical Provider, MD More than a month Active   spironolactone (Aldactone) 25 mg tablet [id number] Yes Take 1 tablet (25 mg) by mouth once daily. Historical Provider, MD More than a month Active   torsemide (Demadex) 20 mg tablet [id number] Yes TAKE 1 TABLET BY MOUTH BEFORE breakfast Historical Provider, MD More than a month Active                       Objective     Last Recorded Vitals  BP 94/64   Pulse 82   Temp 36.6 °C (97.9 °F) (Temporal)   Resp 18   Wt 64 kg (141 lb)   SpO2 96%   Intake/Output last 3 Shifts:    Intake/Output Summary (Last 24 hours) at 11/20/2023 0809  Last data filed at 11/20/2023 0806  Gross per 24 hour   Intake 850 ml   Output 100 ml   Net 750 ml       Admission Weight  Weight: 64 kg (141 lb) (11/19/23 1804)    Daily Weight  11/19/23 : 64 kg (141 lb)    Image Results  XR tibia fibula right 2 views  Narrative: Interpreted By:  Chelita Weeks,   STUDY:  XR TIBIA FIBULA RIGHT 2 VIEWS; ;  11/19/2023 9:56 pm      INDICATION:  Signs/Symptoms:leg wound, bony tenderness lateral.      COMPARISON:  None.      ACCESSION NUMBER(S):  [id number]      ORDERING CLINICIAN:  MARGARET RODRIGUEZ      FINDINGS:  AP and lateral views of the tibia/fibula are obtained.      No acute fracture or dislocation. Bones are well mineralized. No  osseous erosive changes seen. Vascular calcifications noted.  Tricompartmental knee osteoarthrosis. Diffuse soft tissue swelling.  Soft tissues are unremarkable.      Impression: No acute fracture or osseous erosive change is seen. Diffuse soft  tissue swelling.       Tricompartmental knee osteoarthrosis.          MACRO:  None      Signed by: Chelita Weeks 11/19/2023 10:00 PM  Dictation workstation:   [id number]  XR chest 1 view  Narrative: Interpreted By:  Marlene Bowling,   STUDY:  XR CHEST 1 VIEW;  11/19/2023 7:15 pm      INDICATION:  Signs/Symptoms:shortness of breath, sepsis.      COMPARISON:  Radiographs of the chest dated 07/13/2023      ACCESSION NUMBER(S):  [id number]      ORDERING CLINICIAN:  MARGARET RODRIGUEZ      FINDINGS:  AP radiograph of the chest was provided.      Lung bases not included in the exam. Left subclavian hemodialysis  catheter overlies the superior cavoatrial junction.      CARDIOMEDIASTINAL SILHOUETTE:  Cardiomediastinal silhouette is enlarged.      LUNGS:  Loculated right-sided effusion appears similar to prior exam,  although the right lung bases of the partially included on current  study. No sizable pneumothorax or new consolidation is present. Left  lung bases incompletely evaluated.      ABDOMEN:  No remarkable upper abdominal findings.      BONES:  No acute osseous changes.      Impression: 1.  Limited evaluation, with lung bases incompletely included in the  study.  2. Partially visualized right-sided pleural effusion with associated  atelectatic/consolidative changes is similar in appearance to prior  exam. Left lung bases incompletely evaluated.  3. Similar appearance of cardiomegaly is prior study on 10/13/2023.              MACRO:  None      Signed by: Marlene Bowling 11/19/2023 7:34 PM  Dictation workstation:   [id number]      Physical Exam    Neck: supple  Lung: no wheeze  Cv: [id number]  Ext: +Edema    Relevant Results      Results for orders placed or performed during the hospital encounter of 11/19/23 (from the past 24 hour(s))   CBC and Auto Differential   Result Value Ref Range    WBC 13.8 (H) 4.4 - 11.3 x10*3/uL    nRBC 0.1 (H) 0.0 - 0.0 /100 WBCs    RBC 2.93 (L) 4.00 - 5.20 x10*6/uL    Hemoglobin 9.8 (L) 12.0 - 16.0 g/dL     Hematocrit 31.6 (L) 36.0 - 46.0 %     (H) 80 - 100 fL    MCH 33.4 26.0 - 34.0 pg    MCHC 31.0 (L) 32.0 - 36.0 g/dL    RDW 20.3 (H) 11.5 - 14.5 %    Platelets 161 150 - 450 x10*3/uL    Neutrophils % 91.4 40.0 - 80.0 %    Immature Granulocytes %, Automated 0.7 0.0 - 0.9 %    Lymphocytes % 2.3 13.0 - 44.0 %    Monocytes % 5.5 2.0 - 10.0 %    Eosinophils % 0.0 0.0 - 6.0 %    Basophils % 0.1 0.0 - 2.0 %    Neutrophils Absolute 12.56 (H) 1.60 - 5.50 x10*3/uL    Immature Granulocytes Absolute, Automated 0.09 0.00 - 0.50 x10*3/uL    Lymphocytes Absolute 0.32 (L) 0.80 - 3.00 x10*3/uL    Monocytes Absolute 0.76 0.05 - 0.80 x10*3/uL    Eosinophils Absolute 0.00 0.00 - 0.40 x10*3/uL    Basophils Absolute 0.02 0.00 - 0.10 x10*3/uL   Comprehensive Metabolic Panel   Result Value Ref Range    Glucose 270 (H) 74 - 99 mg/dL    Sodium 131 (L) 136 - 145 mmol/L    Potassium 4.4 3.5 - 5.3 mmol/L    Chloride 91 (L) 98 - 107 mmol/L    Bicarbonate 24 21 - 32 mmol/L    Anion Gap 20 10 - 20 mmol/L    Urea Nitrogen 68 (H) 6 - 23 mg/dL    Creatinine 3.01 (H) 0.50 - 1.05 mg/dL    eGFR 15 (L) >60 mL/min/1.73m*2    Calcium 9.0 8.6 - 10.3 mg/dL    Albumin 3.5 3.4 - 5.0 g/dL    Alkaline Phosphatase 254 (H) 33 - 136 U/L    Total Protein 6.4 6.4 - 8.2 g/dL    AST 24 9 - 39 U/L    Bilirubin, Total 1.5 (H) 0.0 - 1.2 mg/dL    ALT 28 7 - 45 U/L   Lactate   Result Value Ref Range    Lactate 3.6 (H) 0.4 - 2.0 mmol/L   Blood Culture    Specimen: Peripheral Venipuncture; Blood culture   Result Value Ref Range    Blood Culture Loaded on Instrument - Culture in progress    Troponin I, High Sensitivity   Result Value Ref Range    Troponin I, High Sensitivity 67 (HH) 0 - 13 ng/L   Coagulation Screen   Result Value Ref Range    Protime 31.1 (H) 9.8 - 12.8 seconds    INR 2.7 (H) 0.9 - 1.1    aPTT 34 27 - 38 seconds   Morphology   Result Value Ref Range    RBC Morphology See Below     Polychromasia Mild    B-type natriuretic peptide   Result Value Ref  Range    BNP 3,789 (H) 0 - 99 pg/mL   Blood Culture    Specimen: Peripheral Venipuncture; Blood culture   Result Value Ref Range    Blood Culture Loaded on Instrument - Culture in progress    Lactate   Result Value Ref Range    Lactate 2.7 (H) 0.4 - 2.0 mmol/L   Lactate   Result Value Ref Range    Lactate 1.7 0.4 - 2.0 mmol/L   Troponin I, High Sensitivity   Result Value Ref Range    Troponin I, High Sensitivity 64 (HH) 0 - 13 ng/L                Assessment/Plan        Assessment  ESRD on HD TS  Volume excess  RLE wound  Anemia of ckd  H/o HTN  H/o DM    Plan  HD today with goal UF 2-3L as BP allows  Discussed possible increase of HD to 3x/week fro volume control  Will assess daily for RRT needs       Thank you       Roberth Gómez MD

## 2023-11-20 NOTE — H&P
History Of Present Illness  Kiara Doss is a 88 y.o. female presenting with past medical history of DVT on Eliquis end-stage renal disease on hemodialysis history of heart failure with reduced ejection fraction diabetes admitted to the hospital related to redness irritation and warmness over the right lower extremity for the past 2 days patient has increased redness pain and swelling over the right lower extremity consistent with cellulitis.     Past Medical History  She has a past medical history of Allergic rhinitis, Cardiac murmur, Chronic anemia, Current use of long term anticoagulation, End-stage renal disease on hemodialysis (CMS/[facility]), First degree AV block, Gout, Heart failure with reduced ejection fraction (CMS/[facility]), History of basal cell carcinoma, History of deep vein thrombosis, Hyperlipidemia, Hypertension, Iron deficiency, Mitral valve regurgitation, Non-insulin dependent type 2 diabetes mellitus (CMS/[facility]), Osteoarthritis, and Osteoporosis.    Surgical History  She has a past surgical history that includes Tonsillectomy (03/25/2014); IR CVC tunneled (06/30/2023); Laminectomy; and Excision basal cell carcinoma.     Social History  She reports that she has never smoked. She has never used smokeless tobacco. No history on file for alcohol use and drug use.    Family History  Family History   Problem Relation Name Age of Onset    Coronary artery disease Mother      COPD Father          Allergies  Lisinopril, Raloxifene, and Statins-hmg-coa reductase inhibitors    Review of Systems   Constitutional:  Negative for diaphoresis and fatigue.   HENT:  Negative for ear pain, facial swelling, tinnitus and trouble swallowing.    Eyes:  Negative for photophobia and visual disturbance.   Respiratory:  Negative for choking and stridor.    Cardiovascular:  Negative for chest pain and palpitations.   Gastrointestinal:  Negative for abdominal pain, blood in stool and diarrhea.   Endocrine: Negative for cold  "intolerance, heat intolerance, polydipsia and polyuria.   Musculoskeletal:  Negative for back pain and joint swelling.   Skin:  Negative for color change and rash.   Allergic/Immunologic: Negative for food allergies.   Neurological:  Negative for tremors, facial asymmetry and weakness.   Psychiatric/Behavioral:  The patient is not hyperactive.       Physical Exam  HENT:      Right Ear: External ear normal.      Left Ear: External ear normal.      Mouth/Throat:      Mouth: Mucous membranes are moist.   Cardiovascular:      Rate and Rhythm: Normal rate and regular rhythm.      Heart sounds: No murmur heard.     No friction rub. No gallop.   Pulmonary:      Effort: No accessory muscle usage or respiratory distress.      Breath sounds: No stridor. No wheezing or rhonchi.   Chest:      Chest wall: No tenderness.   Abdominal:      General: There is no distension.      Palpations: There is no mass.      Tenderness: There is no abdominal tenderness. There is no guarding or rebound.   Musculoskeletal:         Redness over the right lateral leg consistent with cellulitis skin:     Coloration: Skin is not jaundiced or pale.      Findings: No lesion.   Neurological:      General: No focal deficit present.      Mental Status: He is alert, oriented to person, place, and time and easily aroused.      Cranial Nerves: No cranial nerve deficit.      Sensory: No sensory deficit.      Motor: No weakness.        Last Recorded Vitals  Blood pressure 94/64, pulse 82, temperature 36.6 °C (97.9 °F), temperature source Temporal, resp. rate 18, height 1.549 m (5' 1\"), weight 64 kg (141 lb), SpO2 96 %.    Labs    Admission on 11/19/2023   Component Date Value Ref Range Status    WBC 11/19/2023 13.8 (H)  4.4 - 11.3 x10*3/uL Final    nRBC 11/19/2023 0.1 (H)  0.0 - 0.0 /100 WBCs Final    RBC 11/19/2023 2.93 (L)  4.00 - 5.20 x10*6/uL Final    Hemoglobin 11/19/2023 9.8 (L)  12.0 - 16.0 g/dL Final    Hematocrit 11/19/2023 31.6 (L)  36.0 - 46.0 % " Final    MCV 11/19/2023 108 (H)  80 - 100 fL Final    MCH 11/19/2023 33.4  26.0 - 34.0 pg Final    MCHC 11/19/2023 31.0 (L)  32.0 - 36.0 g/dL Final    RDW 11/19/2023 20.3 (H)  11.5 - 14.5 % Final    Platelets 11/19/2023 161  150 - 450 x10*3/uL Final    Neutrophils % 11/19/2023 91.4  40.0 - 80.0 % Final    Immature Granulocytes %, Automated 11/19/2023 0.7  0.0 - 0.9 % Final    Immature Granulocyte Count (IG) includes promyelocytes, myelocytes and metamyelocytes but does not include bands. Percent differential counts (%) should be interpreted in the context of the absolute cell counts (cells/UL).    Lymphocytes % 11/19/2023 2.3  13.0 - 44.0 % Final    Monocytes % 11/19/2023 5.5  2.0 - 10.0 % Final    Eosinophils % 11/19/2023 0.0  0.0 - 6.0 % Final    Basophils % 11/19/2023 0.1  0.0 - 2.0 % Final    Neutrophils Absolute 11/19/2023 12.56 (H)  1.60 - 5.50 x10*3/uL Final    Percent differential counts (%) should be interpreted in the context of the absolute cell counts (cells/uL).    Immature Granulocytes Absolute, Au* 11/19/2023 0.09  0.00 - 0.50 x10*3/uL Final    Lymphocytes Absolute 11/19/2023 0.32 (L)  0.80 - 3.00 x10*3/uL Final    Monocytes Absolute 11/19/2023 0.76  0.05 - 0.80 x10*3/uL Final    Eosinophils Absolute 11/19/2023 0.00  0.00 - 0.40 x10*3/uL Final    Basophils Absolute 11/19/2023 0.02  0.00 - 0.10 x10*3/uL Final    Glucose 11/19/2023 270 (H)  74 - 99 mg/dL Final    Sodium 11/19/2023 131 (L)  136 - 145 mmol/L Final    Potassium 11/19/2023 4.4  3.5 - 5.3 mmol/L Final    Chloride 11/19/2023 91 (L)  98 - 107 mmol/L Final    Bicarbonate 11/19/2023 24  21 - 32 mmol/L Final    Anion Gap 11/19/2023 20  10 - 20 mmol/L Final    Urea Nitrogen 11/19/2023 68 (H)  6 - 23 mg/dL Final    Creatinine 11/19/2023 3.01 (H)  0.50 - 1.05 mg/dL Final    eGFR 11/19/2023 15 (L)  >60 mL/min/1.73m*2 Final    Calculations of estimated GFR are performed using the 2021 CKD-EPI Study Refit equation without the race variable for the  IDMS-Traceable creatinine methods.  https://jasn.asnjournals.org/content/early/2021/09/22/ASN.8302307433    Calcium 11/19/2023 9.0  8.6 - 10.3 mg/dL Final    Albumin 11/19/2023 3.5  3.4 - 5.0 g/dL Final    Alkaline Phosphatase 11/19/2023 254 (H)  33 - 136 U/L Final    Total Protein 11/19/2023 6.4  6.4 - 8.2 g/dL Final    AST 11/19/2023 24  9 - 39 U/L Final    Bilirubin, Total 11/19/2023 1.5 (H)  0.0 - 1.2 mg/dL Final    ALT 11/19/2023 28  7 - 45 U/L Final    Patients treated with Sulfasalazine may generate falsely decreased results for ALT.    Lactate 11/19/2023 3.6 (H)  0.4 - 2.0 mmol/L Final    Blood Culture 11/19/2023 Loaded on Instrument - Culture in progress   Preliminary    Blood Culture 11/19/2023 Loaded on Instrument - Culture in progress   Preliminary    Troponin I, High Sensitivity 11/19/2023 67 (HH)  0 - 13 ng/L Final    Protime 11/19/2023 31.1 (H)  9.8 - 12.8 seconds Final    INR 11/19/2023 2.7 (H)  0.9 - 1.1 Final    aPTT 11/19/2023 34  27 - 38 seconds Final    Lactate 11/19/2023 2.7 (H)  0.4 - 2.0 mmol/L Final    RBC Morphology 11/19/2023 See Below   Final    Polychromasia 11/19/2023 Mild   Final    Lactate 11/19/2023 1.7  0.4 - 2.0 mmol/L Final    BNP 11/19/2023 3,789 (H)  0 - 99 pg/mL Final    Troponin I, High Sensitivity 11/19/2023 64 (HH)  0 - 13 ng/L Final    Previous result verified on 11/19/2023 2103 on specimen/case [id number] called with component [facility] for procedure Troponin I, High Sensitivity with value 67 ng/L.    WBC 11/20/2023 14.7 (H)  4.4 - 11.3 x10*3/uL Final    nRBC 11/20/2023 0.0  0.0 - 0.0 /100 WBCs Final    RBC 11/20/2023 2.71 (L)  4.00 - 5.20 x10*6/uL Final    Hemoglobin 11/20/2023 9.2 (L)  12.0 - 16.0 g/dL Final    Hematocrit 11/20/2023 29.7 (L)  36.0 - 46.0 % Final    MCV 11/20/2023 110 (H)  80 - 100 fL Final    MCH 11/20/2023 33.9  26.0 - 34.0 pg Final    MCHC 11/20/2023 31.0 (L)  32.0 - 36.0 g/dL Final    RDW 11/20/2023 20.7 (H)  11.5 - 14.5 % Final    Platelets  11/20/2023 134 (L)  150 - 450 x10*3/uL Final         Imaging     XR tibia fibula right 2 views  Narrative: Interpreted By:  Chelita Weeks,   STUDY:  XR TIBIA FIBULA RIGHT 2 VIEWS; ;  11/19/2023 9:56 pm      INDICATION:  Signs/Symptoms:leg wound, bony tenderness lateral.      COMPARISON:  None.      ACCESSION NUMBER(S):  [id number]      ORDERING CLINICIAN:  MARGARET RODRIGUEZ      FINDINGS:  AP and lateral views of the tibia/fibula are obtained.      No acute fracture or dislocation. Bones are well mineralized. No  osseous erosive changes seen. Vascular calcifications noted.  Tricompartmental knee osteoarthrosis. Diffuse soft tissue swelling.  Soft tissues are unremarkable.      Impression: No acute fracture or osseous erosive change is seen. Diffuse soft  tissue swelling.      Tricompartmental knee osteoarthrosis.          MACRO:  None      Signed by: Chelita Weeks 11/19/2023 10:00 PM  Dictation workstation:   [id number]  XR chest 1 view  Narrative: Interpreted By:  Marlene Bowling,   STUDY:  XR CHEST 1 VIEW;  11/19/2023 7:15 pm      INDICATION:  Signs/Symptoms:shortness of breath, sepsis.      COMPARISON:  Radiographs of the chest dated 07/13/2023      ACCESSION NUMBER(S):  [id number]      ORDERING CLINICIAN:  MARGARET RODRIGUEZ      FINDINGS:  AP radiograph of the chest was provided.      Lung bases not included in the exam. Left subclavian hemodialysis  catheter overlies the superior cavoatrial junction.      CARDIOMEDIASTINAL SILHOUETTE:  Cardiomediastinal silhouette is enlarged.      LUNGS:  Loculated right-sided effusion appears similar to prior exam,  although the right lung bases of the partially included on current  study. No sizable pneumothorax or new consolidation is present. Left  lung bases incompletely evaluated.      ABDOMEN:  No remarkable upper abdominal findings.      BONES:  No acute osseous changes.      Impression: 1.  Limited evaluation, with lung bases incompletely included in the  study.  2.  Partially visualized right-sided pleural effusion with associated  atelectatic/consolidative changes is similar in appearance to prior  exam. Left lung bases incompletely evaluated.  3. Similar appearance of cardiomegaly is prior study on 10/13/2023.              MACRO:  None      Signed by: Marlene Bowling 11/19/2023 7:34 PM  Dictation workstation:   [id number]       Patient Active Problem List   Diagnosis    Severe sepsis with acute organ dysfunction (CMS/HCC)    Elevated troponin    Leg wound, right, initial encounter    Hyperglycemia    Sepsis due to cellulitis (CMS/HCC)         Assessment/Plan   Principal Problem:    Severe sepsis with acute organ dysfunction (CMS/HCC)  Active Problems:    Elevated troponin    Leg wound, right, initial encounter    Hyperglycemia    Sepsis due to cellulitis (CMS/HCC)      Start patient on antibiotics waiting for blood culture elevation compression stockings  Consult nephrology for hemodialysis  Elevation of troponin related to chronic kidney disease nonsignificant  Continue with anticoagulation history of DVT             ANNAMARIA Camacho MD

## 2023-11-20 NOTE — CARE PLAN
The patient's goals for the shift include  remain comfortable in regards to pain and positioning.    The clinical goals for the shift include sleep

## 2023-11-20 NOTE — NURSING NOTE
0330 - Pt. Here from ED with son at bedside.  Transferred to bed from cart with slide board.  Pt. Is AxOx4 however, [place].   Denies pain at this time.  VS obtained.  BP soft at this time.  Pt. Denies being symptomatic.  Ivs flushed and patent.  Multiple wounds noted.  Pictures obtained and uploaded to chart.  Pt. Positioned supine with BLE elevated per order.  RLE ANUPAM at this time.  Scant serosanguinous drainage noted.  Chux pad placed on pillow for drainage.  Admission completed with both pt and son's input.  Son states he and his wife have all equipment needed to assist patient at home on discharge.    0413 - one time order for roxicodone given for pain rating of 4/10 at RLE. Will reasess pain within one hour.    0630 - VS rechecked for pt. Comfort.   BP 94/64.  Denies pain at this time.  Repositioned to L side with pillow support.  BLE remain elevated.  Pt. Able to make needs known. Call light within reach.  RLE without drainage at this time.

## 2023-11-20 NOTE — PROGRESS NOTES
Physical Therapy                 Therapy Communication Note    Patient Name: Kiara Doss  MRN: [id number]  Today's Date: 11/20/2023     Discipline: Physical Therapy    Missed Time: Attempt    Comment: Pt chart reviewed and spoke to pt's nurse on floor. Pt off floor this am for chest xray and has dialysis this pm. Will attempt again as appropriate and able.

## 2023-11-20 NOTE — H&P
History Of Present Illness  Kiara Doss is a 88 y.o. female with medical history significant for remote DVT currently on DOAC, end-stage renal disease with Tuesday Saturday dialysis via a right chest wall tunneled catheter, heart failure with reduced ejection fraction, noninsulin-dependent type 2 diabetes, mitral valve regurgitation with murmur, and known first-degree AV block presenting to [facility] on 11/19/2023 with complaint of leg pain.     She says that over the past 2 or 3 days may be the skin on her right lateral shin has been weeping serous fluid.  This morning she woke up to significant pain in the area so she elected to come to the emergency department.  She does not believe she suffered any traumatic injury.  On exam there is an irregular area of deep purple discoloration with central irregular shallow erosion.  There is no drainage acutely and the area is exquisitely tender to touch.  There is no erythema or warmth typically associated with cellulitis.    Labs tonight are remarkable for white blood cell count of 13.8, initial lactate of 3.6, blood glucose of 270, BNP of 3789, and flat troponin elevation at 67 with recheck of 64.  Troponin elevation is favored to be related to acute illness and chronic kidney disease rather than acute coronary syndrome.  Chest x-ray suggests a possible consolidation but she reports no respiratory symptoms and we will obtain a two-view chest x-ray to further clarify if there is an underlying disease process.  Imaging of her affected tibia and fibula suggests significant soft tissue swelling but no evidence of osteomyelitis.  She does not appear to be fluid overloaded on exam.    She meets severe sepsis criteria with leukocytosis, tachycardia with heart rate max of 106, and organ dysfunction evidenced by elevated lactate at 3.6.  Blood cultures are pending from the emergency department and broad-spectrum antibiotics were initiated and will be continued.  We will also  obtain a nasal MRSA swab to help refine antibiotic selection.  Consults are deferred to attending physician at his explicit request aside from nephrology consult which was placed for IHD orders.    Plan is to admit with telemetry as an inpatient for severe sepsis with organ dysfunction, wound to right lower leg, elevated troponin, and hyperglycemia.    Past Medical History  Past Medical History:   Diagnosis Date    Allergic rhinitis     Cardiac murmur     Chronic anemia     Current use of long term anticoagulation     End-stage renal disease on hemodialysis (CMS/Formerly [facility])     First degree AV block     Gout     Heart failure with reduced ejection fraction (CMS/Formerly [facility])     History of basal cell carcinoma     History of deep vein thrombosis     Hyperlipidemia     Hypertension     Iron deficiency     Mitral valve regurgitation     Non-insulin dependent type 2 diabetes mellitus (CMS/Formerly [facility])     Osteoarthritis     Osteoporosis        Surgical History  Past Surgical History:   Procedure Laterality Date    BASAL CELL CARCINOMA EXCISION      IR CVC TUNNELED  06/30/2023    IR CVC TUNNELED 6/30/2023 [facility]    LAMINECTOMY      TONSILLECTOMY  03/25/2014    Tonsillectomy        Social History  Social History     Tobacco Use    Smoking status: Never    Smokeless tobacco: Never        Family History  Family History   Problem Relation Name Age of Onset    Coronary artery disease Mother      COPD Father          Allergies  Lisinopril, Raloxifene, and Statins-hmg-coa reductase inhibitors    Review of Systems   Constitutional:  Negative for chills and fever.   HENT:  Negative for sore throat and trouble swallowing.    Eyes:  Negative for visual disturbance.   Respiratory:  Negative for cough, chest tightness and shortness of breath.    Cardiovascular:  Positive for leg swelling. Negative for chest pain and palpitations.   Gastrointestinal:  Negative for abdominal pain, nausea and vomiting.   Genitourinary:  Negative for dysuria, flank pain and  "hematuria.   Musculoskeletal:  Positive for gait problem. Negative for neck pain and neck stiffness.        RLE pain   Skin:  Positive for wound.   Neurological:  Negative for dizziness and headaches.   Psychiatric/Behavioral:  Negative for confusion.             Physical Exam  Constitutional:       General: She is not in acute distress.     Appearance: Normal appearance. She is ill-appearing.   HENT:      Head: Normocephalic.      Right Ear: External ear normal.      Left Ear: External ear normal.      Nose: Nose normal.      Mouth/Throat:      Mouth: Mucous membranes are moist.   Eyes:      General: No scleral icterus.  Cardiovascular:      Rate and Rhythm: Normal rate and regular rhythm.      Pulses: Normal pulses.      Heart sounds: Murmur heard.   Pulmonary:      Effort: Pulmonary effort is normal. No respiratory distress.      Breath sounds: Normal breath sounds.   Abdominal:      General: There is no distension.      Palpations: Abdomen is soft.      Tenderness: There is no abdominal tenderness.   Musculoskeletal:         General: Signs of injury present.      Cervical back: No rigidity.      Right lower leg: Edema present.   Skin:     Capillary Refill: Capillary refill takes less than 2 seconds.      Findings: Lesion present. No erythema or rash.   Neurological:      General: No focal deficit present.      Mental Status: She is alert and oriented to person, place, and time. Mental status is at baseline.   Psychiatric:         Mood and Affect: Mood normal.         Behavior: Behavior normal.             Last Recorded Vitals  Visit Vitals  /59   Pulse 91   Temp 36.8 °C (98.2 °F) (Temporal)   Resp 20   Ht 1.549 m (5' 1\")   Wt 64 kg (141 lb)   SpO2 100%   BMI 26.64 kg/m²   Smoking Status Never   BSA 1.66 m²        Relevant Results  Results for orders placed or performed during the hospital encounter of 11/19/23 (from the past 24 hour(s))   CBC and Auto Differential   Result Value Ref Range    WBC 13.8 (H) " 4.4 - 11.3 x10*3/uL    nRBC 0.1 (H) 0.0 - 0.0 /100 WBCs    RBC 2.93 (L) 4.00 - 5.20 x10*6/uL    Hemoglobin 9.8 (L) 12.0 - 16.0 g/dL    Hematocrit 31.6 (L) 36.0 - 46.0 %     (H) 80 - 100 fL    MCH 33.4 26.0 - 34.0 pg    MCHC 31.0 (L) 32.0 - 36.0 g/dL    RDW 20.3 (H) 11.5 - 14.5 %    Platelets 161 150 - 450 x10*3/uL    Neutrophils % 91.4 40.0 - 80.0 %    Immature Granulocytes %, Automated 0.7 0.0 - 0.9 %    Lymphocytes % 2.3 13.0 - 44.0 %    Monocytes % 5.5 2.0 - 10.0 %    Eosinophils % 0.0 0.0 - 6.0 %    Basophils % 0.1 0.0 - 2.0 %    Neutrophils Absolute 12.56 (H) 1.60 - 5.50 x10*3/uL    Immature Granulocytes Absolute, Automated 0.09 0.00 - 0.50 x10*3/uL    Lymphocytes Absolute 0.32 (L) 0.80 - 3.00 x10*3/uL    Monocytes Absolute 0.76 0.05 - 0.80 x10*3/uL    Eosinophils Absolute 0.00 0.00 - 0.40 x10*3/uL    Basophils Absolute 0.02 0.00 - 0.10 x10*3/uL   Comprehensive Metabolic Panel   Result Value Ref Range    Glucose 270 (H) 74 - 99 mg/dL    Sodium 131 (L) 136 - 145 mmol/L    Potassium 4.4 3.5 - 5.3 mmol/L    Chloride 91 (L) 98 - 107 mmol/L    Bicarbonate 24 21 - 32 mmol/L    Anion Gap 20 10 - 20 mmol/L    Urea Nitrogen 68 (H) 6 - 23 mg/dL    Creatinine 3.01 (H) 0.50 - 1.05 mg/dL    eGFR 15 (L) >60 mL/min/1.73m*2    Calcium 9.0 8.6 - 10.3 mg/dL    Albumin 3.5 3.4 - 5.0 g/dL    Alkaline Phosphatase 254 (H) 33 - 136 U/L    Total Protein 6.4 6.4 - 8.2 g/dL    AST 24 9 - 39 U/L    Bilirubin, Total 1.5 (H) 0.0 - 1.2 mg/dL    ALT 28 7 - 45 U/L   Lactate   Result Value Ref Range    Lactate 3.6 (H) 0.4 - 2.0 mmol/L   Blood Culture    Specimen: Peripheral Venipuncture; Blood culture   Result Value Ref Range    Blood Culture Loaded on Instrument - Culture in progress    Troponin I, High Sensitivity   Result Value Ref Range    Troponin I, High Sensitivity 67 (HH) 0 - 13 ng/L   Coagulation Screen   Result Value Ref Range    Protime 31.1 (H) 9.8 - 12.8 seconds    INR 2.7 (H) 0.9 - 1.1    aPTT 34 27 - 38 seconds    Morphology   Result Value Ref Range    RBC Morphology See Below     Polychromasia Mild    B-type natriuretic peptide   Result Value Ref Range    BNP 3,789 (H) 0 - 99 pg/mL   Blood Culture    Specimen: Peripheral Venipuncture; Blood culture   Result Value Ref Range    Blood Culture Loaded on Instrument - Culture in progress    Lactate   Result Value Ref Range    Lactate 2.7 (H) 0.4 - 2.0 mmol/L   Lactate   Result Value Ref Range    Lactate 1.7 0.4 - 2.0 mmol/L   Troponin I, High Sensitivity   Result Value Ref Range    Troponin I, High Sensitivity 64 (HH) 0 - 13 ng/L        Home Medications  Prior to Admission medications    Medication Sig Start Date End Date Taking? Authorizing Provider   allopurinol (Zyloprim) 100 mg tablet Take 1 tablet (100 mg) by mouth once daily. 2/21/23  Yes Historical Provider, MD   apixaban (Eliquis) 2.5 mg tablet 1 tablet (2.5 mg). 8/21/23  Yes Historical Provider, MD   furosemide (Lasix) 40 mg tablet 1 tablet (40 mg). 8/21/23  Yes Historical Provider, MD   furosemide (Lasix) 80 mg tablet Take 1 tablet (80 mg) by mouth 2 times a day. 10/11/23  Yes Historical Provider, MD   hydroCHLOROthiazide (HYDRODiuril) 25 mg tablet Take 1 tablet (25 mg) by mouth once daily. 7/31/22  Yes Historical Provider, MD   metFORMIN (Glucophage) 500 mg tablet Take 1 tablet (500 mg) by mouth 2 times a day. 8/3/22  Yes Historical Provider, MD   metoprolol succinate XL (Toprol-XL) 50 mg 24 hr tablet Take 1 tablet (50 mg) by mouth once daily. 4/18/22  Yes Historical Provider, MD   spironolactone (Aldactone) 25 mg tablet Take 1 tablet (25 mg) by mouth once daily. 2/8/22  Yes Historical Provider, MD   torsemide (Demadex) 20 mg tablet TAKE 1 TABLET BY MOUTH BEFORE breakfast 10/11/23  Yes Historical Provider, MD   amLODIPine (Norvasc) 5 mg tablet Take 1 tablet (5 mg) by mouth once daily with a meal.    Historical Provider, MD   atorvastatin (Lipitor) 10 mg tablet Take 1 tablet (10 mg) by mouth once daily.    Historical  Provider, MD   Eliquis 5 mg tablet Take 1 tablet (5 mg) by mouth every 12 hours.    Historical Provider, MD   Januvia 25 mg tablet Take 1 tablet (25 mg) by mouth once daily.    Historical Provider, MD   losartan (Cozaar) 50 mg tablet Take 1 tablet (50 mg) by mouth once daily.    Historical Provider, MD       Medications  Scheduled medications    Continuous medications    PRN medications      Imaging  XR tibia fibula right 2 views    Result Date: 11/19/2023  Interpreted By:  Chelita Weeks, STUDY: XR TIBIA FIBULA RIGHT 2 VIEWS; ;  11/19/2023 9:56 pm   INDICATION: Signs/Symptoms:leg wound, bony tenderness lateral.   COMPARISON: None.   ACCESSION NUMBER(S): [id number]   ORDERING CLINICIAN: MARGARET RODRIGUEZ   FINDINGS: AP and lateral views of the tibia/fibula are obtained.   No acute fracture or dislocation. Bones are well mineralized. No osseous erosive changes seen. Vascular calcifications noted. Tricompartmental knee osteoarthrosis. Diffuse soft tissue swelling. Soft tissues are unremarkable.       No acute fracture or osseous erosive change is seen. Diffuse soft tissue swelling.   Tricompartmental knee osteoarthrosis.     MACRO: None   Signed by: Chelita Weeks 11/19/2023 10:00 PM Dictation workstation:   [id number]    XR chest 1 view    Result Date: 11/19/2023  Interpreted By:  Marlene Bowling, STUDY: XR CHEST 1 VIEW;  11/19/2023 7:15 pm   INDICATION: Signs/Symptoms:shortness of breath, sepsis.   COMPARISON: Radiographs of the chest dated 07/13/2023   ACCESSION NUMBER(S): [id number]   ORDERING CLINICIAN: MARGARET RODRIGUEZ   FINDINGS: AP radiograph of the chest was provided.   Lung bases not included in the exam. Left subclavian hemodialysis catheter overlies the superior cavoatrial junction.   CARDIOMEDIASTINAL SILHOUETTE: Cardiomediastinal silhouette is enlarged.   LUNGS: Loculated right-sided effusion appears similar to prior exam, although the right lung bases of the partially included on current study. No  sizable pneumothorax or new consolidation is present. Left lung bases incompletely evaluated.   ABDOMEN: No remarkable upper abdominal findings.   BONES: No acute osseous changes.       1.  Limited evaluation, with lung bases incompletely included in the study. 2. Partially visualized right-sided pleural effusion with associated atelectatic/consolidative changes is similar in appearance to prior exam. Left lung bases incompletely evaluated. 3. Similar appearance of cardiomegaly is prior study on 10/13/2023.       MACRO: None   Signed by: Marlene Bowling 11/19/2023 7:34 PM Dictation workstation:   [id number]         Assessment/Plan   Principal Problem:    Severe sepsis with acute organ dysfunction (CMS/HCC)  Active Problems:    Elevated troponin    Leg wound, right, initial encounter        Plan:  Blood cultures pending, continue Zosyn for now, nasal MRSA screen, careful analgesia, wound care    Telemetry monitoring for troponin elevation    Plan to resume home medications as appropriate, see orders for additional plan elements, management deferred to attending and consult physicians.    VTE prophylaxis:   DVT prophylaxis: Resumed home anticoagulation      Code Status  DNR and No Intubation    I spent 45 minutes in the professional and overall care of this patient.      Dasha Bryant, APRN-CNP  [facility] pager [phone]

## 2023-11-20 NOTE — PROGRESS NOTES
1000 Met with patient and her son at the bedside with patient permission. Confirmed address, phone, and contacts. Patient states she lives at home with her son and Daughter in law. She has her own living area in the basement that is completely finished and remodeled. Has a chair lift top get upstairs and has a Rolator on each floor. Per Ivan, her son, she has a shower chair, handle bars, and railings She has a small kitchen area as well. Patient states she and her DIL manage her medications and denies issues affording. Son and DIL drive. PCP is Dr Lovelace-sees every 6 months. Patient had HC recently in July-son does not recall name. Patient goes to [facility] and Sat with Dr Amadeo Sanchez.  Possible HC at [place]. Will Follow Aubrie Dey RN [facility]

## 2023-11-20 NOTE — PROGRESS NOTES
Occupational Therapy                 Therapy Communication Note    Patient Name: Kiara Doss  MRN: [id number]  Today's Date: 11/20/2023     Discipline: Occupational Therapy    Missed Visit Reason:  OT orders received and chart reviewed.Pt. off floor at xray and has dialysis this pm. Will attempt again as able    Missed Time: Attempt    Comment:

## 2023-11-20 NOTE — NURSING NOTE
0700: Assumed care of pt. At this time    0845: Pt. Taken to CXR at this time. Family at the bedside. Update provided. Will continue to follow    0930: Spoke with dialysis at this time. They report that the pt. Will have dialysis this afternoon as the order went in later this morning. Okay to give AM meds.    1515: Pt. Getting dialysis at this time.    1540: PT. Complaining of pain at this time during dialysis. The pt. Received dose of IV dilaudid in addition to IV albumin to help keep SBP elevated while getting treatment. Will continue to follow    1620: Reached out to Dr. Gómez again to ask about hypotension and if there is anything else we can do to help get the pt. Through the dialysis treatment. Awaiting a response and attempting to get dialysis tech and Dalia in contact. Will follow    1625: Dialysis tech at the bedside discussing with Dr. Gómez at this time regarding low BP and how to best manage.     1645: Received update from dialysis tech at this time. He was able to speak with MD and Dr. Gómez is asking to lower goal to be able to have the pt. Tolerate treatment and to stop treatment if SBP goes lower than 80. Will continue to monitor. Pt. Stable at this time although BP remains low. Pt. Is awake and does not have any symptoms at this time.     1755: Pt. Resting at this time and finishing up dialysis treatment. The pt. Tolerated well and was able to remain asymptomatic with low BP's. Will continue to monitor this pt. Throughout the duration of this shift. Family at the bedside and call light in reach. No further questions or concerns. Will follow

## 2023-11-20 NOTE — CARE PLAN
Problem: Fall/Injury  Goal: Not fall by end of shift  Outcome: Progressing  Goal: Be free from injury by end of the shift  Outcome: Progressing  Goal: Verbalize understanding of personal risk factors for fall in the hospital  Outcome: Progressing  Goal: Verbalize understanding of risk factor reduction measures to prevent injury from fall in the home  Outcome: Progressing  Goal: Use assistive devices by end of the shift  Outcome: Progressing  Goal: Pace activities to prevent fatigue by end of the shift  Outcome: Progressing     Problem: Pain  Goal: My pain/discomfort is manageable  Outcome: Progressing     Problem: Skin  Goal: Decreased wound size/increased tissue granulation at next dressing change  Outcome: Progressing  Goal: Participates in plan/prevention/treatment measures  Outcome: Progressing  Goal: Prevent/manage excess moisture  Outcome: Progressing  Goal: Prevent/minimize sheer/friction injuries  Outcome: Progressing  Goal: Promote/optimize nutrition  Outcome: Progressing  Goal: Promote skin healing  Outcome: Progressing   The patient's goals for the shift include  improved infection.    The clinical goals for the shift include sleep

## 2023-11-21 NOTE — CARE PLAN
Pt rested throughout the night. Her BP was 85/51 at the beginning of the shift, notified [facility], was told to hold Lasix. Pt was NSR with 1st degree AV block on tele. Pt c/o right leg pain, removed ELAINE hose, and medicated with Tylenol per MAR. Pt up to chair with 1 assist. Pt safety maintained with call light within reach.

## 2023-11-21 NOTE — PROGRESS NOTES
TC to son Ivan to follow up on HC agency name as pt has a dc order. Ivan has not been able to find the name yet but believes he knows where to look. He is updated that pt has a dc order and plans to provide the name of the agency when he picks up his mother. He is agreeable to plan of dc home for his mother with HC in place as previously discussed.     1500 Message received from bedside RN that pt son provided name of [facility] as hc choice. Secure chat sent to Jay Szymanski PCN to place referral.

## 2023-11-21 NOTE — CONSULTS
Wound Care Consult     Visit Date: 11/21/2023      Patient Name: Kiara Doss         MRN: [id number]           YOB: 1935     Reason for Consult: Multiple wounds        Wound History: Present on admission, appears to be from a fall.      Pertinent Labs:   Albumin   Date Value Ref Range Status   11/21/2023 3.3 (L) 3.4 - 5.0 g/dL Final   06/02/2023 2.7 (L) 3.4 - 5.0 g/dL Final     Albumin/Protein Total, Ur   Date Value Ref Range Status   05/31/2023 66.6 Not Established % Final       Wound Assessment:  Wound 11/20/23 Soft Tissue Necrosis Pretibial Right;Anterior;Lower (Active)   Site Assessment Hematoma;Purple;Red;Edema 11/21/23 1054   Vero-Wound Assessment Edema;Red 11/21/23 1054   Shape irreg. 11/20/23 0331   Wound Length (cm) 6 cm 11/21/23 1054   Wound Width (cm) 4 cm 11/21/23 1054   Wound Surface Area (cm^2) 24 cm^2 11/21/23 1054   Wound Depth (cm) 0.1 cm 11/21/23 1054   Wound Volume (cm^3) 2.4 cm^3 11/21/23 1054   Margins Well-defined edges 11/21/23 1054   Drainage Description Serosanguineous 11/21/23 1054   Drainage Amount Large 11/21/23 1054   Dressing Absorptive 11/21/23 1054   Dressing Changed Changed 11/21/23 1054   Dressing Status Clean;Dry 11/21/23 1054       Wound 11/20/23 Traumatic Shoulder Right (Active)   Site Assessment Other (Comment) 11/21/23 1054   Vero-Wound Assessment Intact 11/21/23 1054   Drainage Description None 11/20/23 0331   Drainage Amount None 11/21/23 1054   Dressing Open to air 11/21/23 1054       Wound 11/20/23 Other (comment) Arm Right;Upper;Anterior (Active)   Site Assessment Pink 11/21/23 1054   Vero-Wound Assessment Intact 11/21/23 1054   Drainage Description Serous 11/21/23 1054   Drainage Amount Scant 11/21/23 1054   Dressing Non adherent 11/21/23 1054   Dressing Changed Changed 11/21/23 1054   Dressing Status Clean;Dry 11/21/23 1054       Wound 11/20/23 Diabetic Ulcer Pretibial Left;Anterior (Active)   Site Assessment Intact;Red 11/21/23 1054   Vero-Wound  Assessment Intact 11/21/23 1054   Drainage Description None 11/21/23 1054   Drainage Amount None 11/20/23 0331   Dressing Open to air 11/21/23 1054       Wound Team Summary Assessment: Right shoulder bruising resolving. Right upper arm wound almost healed, continue with non-adherent dressing. LLE wound red and intact, no drainage noted.  No dressing needed at this time. RLE wound appears to be a hematoma that opened with cellulitis.  Wound bed red, wound edges well-defined, surrounding tissue red and warm. Recommendation for wound care to RLE - cleanse with NS, apply Silver Alginate and cover with foam dressing every other day and as needed for drainage.      Wound Team Plan: Will follow     Radha Tolentino RN  11/21/2023  11:02 AM

## 2023-11-21 NOTE — PROGRESS NOTES
Physical Therapy    Physical Therapy Evaluation    Patient Name: Kiara Doss  MRN: [id number]  Today's Date: 11/21/2023   Time Calculation  Start Time: 1022  Stop Time: 1032  Time Calculation (min): 10 min  3004    Assessment/Plan   PT Assessment: Pt demonstrates decreased strength, decreased activity tolerance, and impaired balance/mobility.  Based on current level of function, pt would benefit from continued skilled therapy while in the hospital to ensure safety, decrease risk of falls, and regains strength/mobility back to baseline.  Once stable enough for discharge, pt would benefit from low intensity therapy with 24 hr assist.   PT Assessment Results: Decreased strength, Decreased endurance, Impaired balance, Decreased mobility  Rehab Prognosis: Good  Evaluation/Treatment Tolerance: Patient limited by fatigue, Patient tolerated treatment well  Medical Staff Made Aware: Yes  End of Session Communication: Bedside nurse  End of Session Patient Position: Up in chair, Alarm on  IP OR SWING BED PT PLAN  Inpatient or Swing Bed: Inpatient  PT Plan  Treatment/Interventions: Bed mobility, Transfer training, Gait training, Stair training, Balance training, Neuromuscular re-education, Strengthening, Therapeutic exercise, Therapeutic activity  PT Plan: Skilled PT  PT Frequency: 3 times per week  PT Discharge Recommendations: Low intensity level of continued care (with 24 hr assist)  Equipment Recommended upon Discharge: Wheeled walker  PT Recommended Transfer Status: Assist x1  PT - OK to Discharge: Yes - To next level of care when cleared by medical team    Subjective     Current Problem:  Patient Active Problem List   Diagnosis    Severe sepsis with acute organ dysfunction (CMS/HCC)    Elevated troponin    Leg wound, right, initial encounter    Hyperglycemia    Sepsis due to cellulitis (CMS/HCC)       General Visit Information:  Per EMR: pt presenting to [facility] ED with complaint of right lower leg pain and weeping of  legs bilaterally.  Patient reports both her lower legs have been weeping for the past 3-4 days then noticed this morning the outside of her right calf has a bruise and is painful at a 9/10.  Denies trauma to the area.  Also notes she feels shortness of breath and states she always feels cold.  Reports she received dialysis yesterday via a port on her right chest.  Nephrologist is Dr. Amadeo Sanchez. Denies fevers, nausea, palpitations, chest pain, urinary symptoms, or diarrhea.   General: On arrival, pt sitting in bedside chair with son at bedside.  Pt in no apparent distress and agreeable to therapy.  Reason for Referral: impaired mobility, gait training  Referred By: ANNAMARIA Camacho  Family/Caregiver Present: Yes  Caregiver Feedback:  (son at bedside)  Co-Treatment: OT  Prior to Session Communication: Bedside nurse  Patient Position Received: Up in chair, Alarm on    Home Living/PLOF:  Pt lives in house with 3 [place]STON with B rails with son and DIL.  Has living area in basement with stair lift down.  Has WIS with chair and Gbs.  Bedroom is on main level.  Family provides transportation.  Pt and son reports x3 falls since June and son states these occur after dialysis.  Pt's son and/or DIL are always home to assist pt.     Precautions:  Precautions  Medical Precautions: Fall precautions       Objective     Pain:  Pain Assessment  Pain Assessment: 0-10  Pain Score: 0 - No pain    Cognition:  Cognition  Overall Cognitive Status: Within Functional Limits    General Assessments:      Static Sitting Balance  Static Sitting-Comment/Number of Minutes: fair plus  Dynamic Sitting Balance  Dynamic Sitting-Comments: fair  Static Standing Balance  Static Standing-Comment/Number of Minutes: fair  Dynamic Standing Balance  Dynamic Standing-Comments: fair minus    Functional Assessments:  Bed Mobility   NT secondary to pt sitting in bedside chair on arrival   Transfers  Sit to stand: CGA initially but required Min A x1 to maintain stand  balance secondary to retro lean which pt was able to self correct with Vcs   Stand to sit: CGA with Vcs for hand placement   Ambulation/Gait Training  Pt ambulated 15 ft in room with FWW and CGA-Min A; slow helena and Vcs required for guidance    Extremity/Trunk Assessments:  BLE  ROM: WFL  Strength  Grossly 4/5    Outcome Measures:  [facility] Basic Mobility  Turning from your back to your side while in a flat bed without using bedrails: A little  Moving from lying on your back to sitting on the side of a flat bed without using bedrails: A little  Moving to and from bed to chair (including a wheelchair): A little  Standing up from a chair using your arms (e.g. wheelchair or bedside chair): A little  To walk in hospital room: A little  Climbing 3-5 steps with railing: A little  Basic Mobility - Total Score: 18    Goals:  Encounter Problems       Encounter Problems (Active)       PT Problem       Pt will be able to perform all bed mobility tasks with SBA.  (Progressing)       Start:  11/21/23    Expected End:  12/05/23            Pt will perform all transfers with SBA and FWW with proper safety mechanics.   (Progressing)       Start:  11/21/23    Expected End:  12/05/23            Pt will ambulate 50 ft with SBA using FWW for improved functional independence.  (Progressing)       Start:  11/21/23    Expected End:  12/05/23            Pt will be able to negotiate 3 steps with 1 HR with SBA.  (Progressing)       Start:  11/21/23    Expected End:  12/05/23            Pt will demonstrate fair plus dynamic stand balance for completion of therapeutic exercises and functional tasks.  (Progressing)       Start:  11/21/23    Expected End:  12/05/23                 Education Documentation  Home Exercise Program, taught by Sun Herron, PT at 11/21/2023  1:20 PM.  Learner: Family, Patient  Readiness: Acceptance  Method: Explanation  Response: Verbalizes Understanding    Mobility Training, taught by Sun Herron PT at  11/21/2023  1:20 PM.  Learner: Family, Patient  Readiness: Acceptance  Method: Explanation  Response: Verbalizes Understanding    Education Comments  No comments found.

## 2023-11-21 NOTE — DISCHARGE SUMMARY
Discharge Diagnosis  Severe sepsis with acute organ dysfunction (CMS/HCC)    Issues Requiring Follow-Up  Follow-up with hemodialysis follow-up at the wound center    Discharge Meds     Your medication list        ASK your doctor about these medications        Instructions Last Dose Given Next Dose Due   allopurinol 100 mg tablet  Commonly known as: Zyloprim           ascorbic acid 500 mg tablet  Commonly known as: Vitamin C           aspirin 81 mg EC tablet           atorvastatin 10 mg tablet  Commonly known as: Lipitor           B complex-vitamin C-folic acid 0.8 mg tablet  Commonly known as: Nephro-Pollo           calcitriol 0.25 mcg capsule  Commonly known as: Rocaltrol           CALCIUM + D ORAL           Eliquis 5 mg tablet  Generic drug: apixaban  Ask about: Which instructions should I use?           furosemide 80 mg tablet  Commonly known as: Lasix  Ask about: Which instructions should I use?           Januvia 25 mg tablet  Generic drug: sitaGLIPtin phosphate           midodrine 5 mg tablet  Commonly known as: Proamatine                    Test Results Pending At Discharge  Pending Labs       Order Current Status    Staphylococcus aureus/MRSA colonization, Culture In process    Blood Culture Preliminary result    Blood Culture Preliminary result            Hospital Course   Patient was admitted to the hospital related to weakness and fall patient was found to have cellulitis of the right lower extremity and mild sepsis treated with antibiotic with improvement of her symptoms patient will be discharged on Augmentin 500 twice a day for 7 days continue with hemodialysis encouraged to wear compression stockings patient had a history of lymphedema  Physical therapy will evaluate the patient for possible skilled nursing versus home    Pertinent Physical Exam At Time of Discharge  Physical Exam  HENT:      Right Ear: External ear normal.      Left Ear: External ear normal.      Mouth/Throat:      Mouth: Mucous  membranes are moist.   Cardiovascular:      Rate and Rhythm: Normal rate and regular rhythm.      Heart sounds: No murmur heard.     No friction rub. No gallop.   Pulmonary:      Effort: No accessory muscle usage or respiratory distress.      Breath sounds: No stridor. No wheezing or rhonchi.   Chest:      Chest wall: No tenderness.   Abdominal:      General: There is no distension.      Palpations: There is no mass.      Tenderness: There is no abdominal tenderness. There is no guarding or rebound.   Musculoskeletal:         General: No deformity or signs of injury.      Cervical back: No rigidity or tenderness. Normal range of motion.      Right lower leg: No edema.      Left lower leg: No edema.   Skin:     Coloration: Skin is not jaundiced or pale.      Findings: Lesion present.      Comments: Redness over the right and left lower extremity and bruising over the right lateral lower extremity   Neurological:      General: No focal deficit present.      Mental Status: She is alert, oriented to person, place, and time and easily aroused.      Cranial Nerves: No cranial nerve deficit.      Sensory: No sensory deficit.      Motor: No weakness.         Outpatient Follow-Up  Future Appointments   Date Time Provider Department Center   3/7/2024 10:15 AM MD MAYLIN [address]iz[address]         ANNAMARIA Camacho MD

## 2023-11-21 NOTE — PROGRESS NOTES
Referral and orders were sent to [facility] homecare and they accepted. Updated nursing.     11/22/23 Confirmed start of care is 11/24/23.    Jay Szymanski

## 2023-11-21 NOTE — PROGRESS NOTES
Occupational Therapy    Evaluation    Patient Name: Kiara Doss  MRN: [id number]  Today's Date: 11/21/2023  Time Calculation  Start Time: 1021  Stop Time: 1034  Time Calculation (min): 13 min        Assessment:  Prognosis: Excellent  Evaluation/Treatment Tolerance: Patient tolerated treatment well  Medical Staff Made Aware: Yes  End of Session Communication: Bedside nurse  End of Session Patient Position: Up in chair, Alarm on  OT Assessment Results: Decreased ADL status, Decreased endurance, Decreased functional mobility, Decreased trunk control for functional activities  Prognosis: Excellent  Evaluation/Treatment Tolerance: Patient tolerated treatment well  Medical Staff Made Aware: Yes  Strengths: Housing layout, Support of Caregivers  Plan:  Treatment Interventions: ADL retraining, Functional transfer training, Endurance training, Patient/family training, Equipment evaluation/education, Compensatory technique education  OT Frequency: 3 times per week  OT Discharge Recommendations: Low intensity level of continued care (with 24 hour assistance from family)  OT - OK to Discharge:  (Pt. will require continued OT and 24 hour assistance upon d/c)  Treatment Interventions: ADL retraining, Functional transfer training, Endurance training, Patient/family training, Equipment evaluation/education, Compensatory technique education    Subjective   Current Problem:  1. Sepsis due to cellulitis (CMS/HCC)        2. Atrial fibrillation, unspecified type (CMS/HCC)        3. 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 Leg wound, right, initial encounter  amoxicillin-pot clavulanate (Augmentin) 500-125 mg tablet      5. Leg edema  Compression stockings      6. Weakness  Referral to Home Health        General:  General  Reason for Referral: ADL, safety assessment  Referred By: Manny  Past Medical History Relevant to Rehab: DVT, ESRD, R chest tunneled  "catheter, heart failure, DM2, mitral valve regurgitation, first degree AV block, OA, osteoporosis, iron deficiency, basal cell carcinoma excision, laminectomy.   Family/Caregiver Present: Yes (son present (pt. lives with this son))  Prior to Session Communication: Bedside nurse  Patient Position Received: Alarm on, Up in chair (son present)  General Comment: To ED with RLE pain, RLE weeping, leg wound, elevated troponing.  In ED met severe sepsis criteria with leukocytosis, tachycardia with heart rate max of 106, and organ dysfunction evidenced by elevated lactate at 3.6. R tib/fib XR 11/19:  No acute fracture or osseous erosive change is seen. Diffuse soft tissue swelling. Tricompartmental knee osteoarthrosis.      Precautions:  Medical Precautions: Fall precautions  Vital Signs:     Pain:  Pain Assessment  Pain Assessment: 0-10  Pain Score: 0 - No pain (\"I don't have any pain, but it feels stiff down here.\"  (pt. pointing to distal RLE).)    Objective   Cognition:  Overall Cognitive Status: Within Functional Limits  Arousal/Alertness: Appropriate responses to stimuli  Orientation Level: Oriented X4           Home Living:  Home Living Comments: Lives with son and daughter in law who are able to provide 24 hour assistance. 3 steps to enter with bilat. Handrails.  Son is ordering a ramp for the entry.  Chair lift to basement suite.  Basement has walk in shower with seat and safety bars.  Sink and fridge in basement.  Pt. Spends most of her time on the first floor with family.  Sleeps in a bed on first floor.  Has rollators on first floor, basement and in garage.  Pt. Was independent with bed mobility, bathing, dressing, ambulation with rollator most of the time, prior to admission. 3 recent falls, all related to low BP after dialysis.  Son now checks BP prior to pt. Mobility, and if 100 or less systolic, he accompanies her for all mobility.  Family drives patient.         ADL:  Eating Assistance: " Independent  Grooming Assistance: Minimal  Bathing Assistance: Moderate  UE Dressing Assistance: Minimal  LE Dressing Assistance: Moderate  Toileting Assistance with Device: Moderate      Transfers  Transfer: Yes (Sit/stand at chair with arms with FWW.  Min assist, retropulsive.)      Ambulation/Gait Training:  Ambulation/Gait Training  Ambulation/Gait Training Performed: Yes (Ambulated ~ 20' with FWW and CGA to min assist.  Slightly unsteady, forward flexed, yet retropulsive.)  Sitting Balance:  Static Sitting Balance  Static Sitting-Comment/Number of Minutes: Normal  Dynamic Sitting Balance  Dynamic Sitting-Comments: Good  Standing Balance:  Static Standing Balance  Static Standing-Comment/Number of Minutes: Fair (-)  Dynamic Standing Balance  Dynamic Standing-Comments: Fair (-)/poor (+)        Extremities: RUE   RUE : Within Functional Limits, LUE   LUE: Within Functional Limits,  , and        Outcome Measures:[facility] Daily Activity  Putting on and taking off regular lower body clothing: A lot  Bathing (including washing, rinsing, drying): A lot  Putting on and taking off regular upper body clothing: A little  Toileting, which includes using toilet, bedpan or urinal: A lot  Taking care of personal grooming such as brushing teeth: A little  Eating Meals: None  Daily Activity - Total Score: 16        Education Documentation  ADL Training, taught by Miranda Batres OT at 11/21/2023  3:27 PM.  Learner: Patient  Readiness: Acceptance  Method: Explanation  Response: Verbalizes Understanding    Education Comments  No comments found.        OP EDUCATION:  Education  Individual(s) Educated: Child (son)  Education Provided:  (Application of and use of gait belt for assisting pt. with transfers and ambulation.  Pt. will require 24 hour assistance.  Son reports that family is able to provide 24 hour assistance.)  Patient Response to Education: Patient/Caregiver Verbalized Understanding of Information, Patient/Caregiver  Performed Return Demonstration of Exercises/Activities, Patient/Caregiver Asked Appropriate Questions    Goals:  Encounter Problems       Encounter Problems (Active)       OT Goals       Close sup ADL functional mobility with FWW.        Start:  11/21/23    Expected End:  12/05/23            Close sup sit/stand, bed/chair/commode transfers with FWW.        Start:  11/21/23    Expected End:  12/05/23            Good (-) dynamic standing balance for ADL with UE support as needed.        Start:  11/21/23    Expected End:  12/05/23            Tolerate 10 mins light functional activity.        Start:  11/21/23    Expected End:  12/05/23            Close sup LB dressing.        Start:  11/21/23    Expected End:  12/05/23

## 2023-12-26 PROBLEM — R57.9 SHOCK (MULTI): Status: ACTIVE | Noted: 2023-01-01

## 2023-12-26 PROBLEM — R65.21 SEPTIC SHOCK (MULTI): Status: ACTIVE | Noted: 2023-01-01

## 2023-12-26 PROBLEM — R00.1 BRADYCARDIA: Status: ACTIVE | Noted: 2023-01-01

## 2023-12-26 PROBLEM — A41.9 SEPTIC SHOCK (MULTI): Status: ACTIVE | Noted: 2023-01-01

## 2023-12-26 NOTE — PROGRESS NOTES
"Vancomycin Dosing by Pharmacy- INITIAL    Kiara Doss is a 88 y.o. year old female who Pharmacy has been consulted for vancomycin dosing for other unknown source . Based on the patient's indication and renal status this patient will be dosed based on a goal trough/random level of 15-20.     Renal function is currently declining.    Visit Vitals  BP (!) 81/42   Pulse 62   Temp 34.9 °C (94.8 °F) (Temporal)   Resp 20        Lab Results   Component Value Date    CREATININE 3.35 (H) 12/26/2023    CREATININE 2.18 (H) 11/21/2023    CREATININE 3.22 (H) 11/20/2023    CREATININE 3.01 (H) 11/19/2023        Patient weight is No results found for: \"PTWEIGHT\"    No results found for: \"CULTURE\"     No intake/output data recorded.  [unfilled]    Lab Results   Component Value Date    PATIENTTEMP  12/26/2023      Comment:      NOTE: Patient Results are Not Corrected for Temperature          Assessment/Plan     Patient has already been given a loading dose of 1500 mg.    Follow-up level will be ordered on 12/27 at 1400 unless clinically indicated sooner.  Will continue to monitor renal function daily while on vancomycin and order serum creatinine at least every 48 hours if not already ordered.  Follow for continued vancomycin needs, clinical response, and signs/symptoms of toxicity.       Esteban Truong, PharmD       "

## 2023-12-26 NOTE — H&P
Critical Care History and Physical        Subjective   Patient is a 88 y.o. female admitted on 12/26/2023 10:12 AM  with chief complaint of fatigue and weakness for one week.     HPI:  Kiara Doss is an 88 year old female with PMH ESRD on T-Th-Sa HD, CHF, DVT on Eliquis, and HTN. Per her son, Ivan, she has been fatigued, weak and slurring speech for the past week. Today she was seen by her home health RN and advised to go to the ER for evaluation. She was previously admitted [facility] for septic shock 2/2 cellulitis of RLE, tissue cx at this time positive for pseudomonas and candida. Per her son her BLE have been worsening and he attempted to reach out to her PCP office over the holiday weekend for additional antibiotics, she completed her course of Augmentin. Also of note she had a LUE AVG placed on 12/15, a few days after insertion her left arm has been cool and pale/light purple with decreased motor function and sensation. However, per her son who she lives with she has not been recently ill or in contact with anyone sick, no fever/chills. Denies complaint of SOB, CP, cough. She is oliguric with decreasing UO on her HD days. Denies difficulty with eating/drinking or changes in bowel/bladder habits. Her BLE are wrapped with ACE bandages, cap refill >3, light purple, cool. LUE distal to AVG, light purple, bruising, cool, cap refill >3.    Upon arrival to ER she is lethargic, mumbling words, no facial droop or focal deficits noted. She is hypothermic 30.4 C temporal requiring Javid Hugger. Bradycardic- 40-50s, EKG shows Afib with slow ventricular response, HR 40bpm. Hypotensive, given sepsis fluids of 2L, started on norepinephrine and epinephrine due to bradycardia. Labs reveal SANDRA 102-99, Lactate 5.7-4.8, BUN/Cr 56/3.35, BNP 1268, WBC 10.1, H/H 7.8/26.7. Imaging CXR- pulmonary vascular congestion and persistent right pleural effusion. CT head/neck negative for acute process. CT C/A/P without contrast showing right  pleural effusion, RLL infiltrate- to be repeated with contrast.     Admitted to ICU for treatment of shock 2/2 sepsis vs cardiogenic requiring vasopressors, ESRD with potential need for CRRT. After discussion with her son who is her HCPOA, she will be DNRCCA/DNI.    Past Medical History:   Diagnosis Date    Allergic rhinitis     Cardiac murmur     Chronic anemia     Current use of long term anticoagulation     End-stage renal disease on hemodialysis (CMS/[facility])     First degree AV block     Gout     Heart failure with reduced ejection fraction (CMS/[facility])     History of basal cell carcinoma     History of deep vein thrombosis     Hyperlipidemia     Hypertension     Iron deficiency     Mitral valve regurgitation     Non-insulin dependent type 2 diabetes mellitus (CMS/[facility])     Osteoarthritis     Osteoporosis      Past Surgical History:   Procedure Laterality Date    BASAL CELL CARCINOMA EXCISION      IR CVC TUNNELED  06/30/2023    IR CVC TUNNELED 6/30/2023 [facility]GIO    LAMINECTOMY      TONSILLECTOMY  03/25/2014    Tonsillectomy     (Not in a hospital admission)    Lisinopril, Raloxifene, and Statins-hmg-coa reductase inhibitors  Social History     Tobacco Use    Smoking status: Never    Smokeless tobacco: Never   Substance Use Topics    Alcohol use: Never    Drug use: Never     Family History   Problem Relation Name Age of Onset    Coronary artery disease Mother      COPD Father         Scheduled Medications:   sodium chloride, 500 mL, intravenous, Once  vancomycin, 1,500 mg, intravenous, Once         Continuous Medications:   norepinephrine, 0.01-0.5 mcg/kg/min, Last Rate: 0.12 mcg/kg/min (12/26/23 1323)         PRN Medications:       Review of Systems:  Review of systems not obtained due to: unable to communicate due to slurred speech, mental status  Review of systems per HPI and otherwise all other systems are negative  Constitutional: positive for  malaise  Eyes: negative for visual disturbance  Respiratory: negative  for cough, dyspnea on exertion, sputum, and wheezing  Cardiovascular: positive for lower extremity edema, negative for chest pressure/discomfort, dyspnea, exertional chest pressure/discomfort, and irregular heart beat  Gastrointestinal: negative for abdominal pain, change in bowel habits, constipation, and diarrhea  Genitourinary: positive for frequency, negative for dysuria and hematuria  Integument/breast: positive for skin color change, negative for pruritus and rash  Musculoskeletal: positive for muscle weakness  Neurological: positive for coordination problems, gait problems, and speech problems, negative for seizures and syncope    Objective   Vitals:  Most Recent:  Vitals:    12/26/23 1342   BP: (!) 91/43   Pulse: (!) 45   Resp: 16   Temp:    SpO2: 92%       24hr Min/Max:  Temp  Min: 35 °C (95 °F)  Max: 35 °C (95 °F)  Pulse  Min: 40  Max: 104  BP  Min: 55/34  Max: 139/112  Resp  Min: 13  Max: 32  SpO2  Min: 32 %  Max: 100 %    LDA:   CVC 12/26/23 Left (Active)   Placement Date/Time: 12/26/23 1400   Orientation: Left   Number of days: 0       Hemodialysis Cath 11/18/23 Right Subclavian (Active)   Placement Date/Time: (c) 11/18/23 0330   Orientation: Right  Access Location: Subclavian   Number of days: 38         Vent settings:       Hemodynamic parameters for last 24 hours:         Intake/Output Summary (Last 24 hours) at 12/26/2023 1454  Last data filed at 12/26/2023 1323  Gross per 24 hour   Intake 613.6 ml   Output --   Net 613.6 ml           Physical exam:    General appearance:  cooperative, mild distress, and pale  Head:  Normocephalic, without obvious abnormality  Lungs:  rhonchi  all lobes  Heart: regular rate and rhythm, S1, S2 normal, no murmur, click, rub or gallop  Abdomen:  soft, non-tender; bowel sounds normal; no masses,  no organomegaly  Extremities: edema, cool   Pulses: 1+ BLE, 1+ BUE  Skin:  ecchymoses  and wound BLE  Neurologic:  Mental status: A/Ox3, lethargic  Motor:3/5 BLE, 2/5 LUE 3/5  GAIL    Lab/Radiology/Diagnostic Review:  Results for orders placed or performed during the hospital encounter of 12/26/23 (from the past 24 hour(s))   CBC and Auto Differential   Result Value Ref Range    WBC 10.1 4.4 - 11.3 x10*3/uL    nRBC 3.0 (H) 0.0 - 0.0 /100 WBCs    RBC 2.26 (L) 4.00 - 5.20 x10*6/uL    Hemoglobin 7.8 (L) 12.0 - 16.0 g/dL    Hematocrit 26.7 (L) 36.0 - 46.0 %     (H) 80 - 100 fL    MCH 34.5 (H) 26.0 - 34.0 pg    MCHC 29.2 (L) 32.0 - 36.0 g/dL    RDW 24.0 (H) 11.5 - 14.5 %    Platelets 44 (L) 150 - 450 x10*3/uL    Immature Granulocytes %, Automated 1.0 (H) 0.0 - 0.9 %    Immature Granulocytes Absolute, Automated 0.10 0.00 - 0.50 x10*3/uL   Comprehensive Metabolic Panel   Result Value Ref Range    Glucose 126 (H) 74 - 99 mg/dL    Sodium 129 (L) 136 - 145 mmol/L    Potassium 4.2 3.5 - 5.3 mmol/L    Chloride 90 (L) 98 - 107 mmol/L    Bicarbonate 21 21 - 32 mmol/L    Anion Gap 22 (H) 10 - 20 mmol/L    Urea Nitrogen 56 (H) 6 - 23 mg/dL    Creatinine 3.35 (H) 0.50 - 1.05 mg/dL    eGFR 13 (L) >60 mL/min/1.73m*2    Calcium 9.5 8.6 - 10.3 mg/dL    Albumin 2.6 (L) 3.4 - 5.0 g/dL    Alkaline Phosphatase 188 (H) 33 - 136 U/L    Total Protein 5.0 (L) 6.4 - 8.2 g/dL    AST 55 (H) 9 - 39 U/L    Bilirubin, Total 1.1 0.0 - 1.2 mg/dL    ALT 24 7 - 45 U/L   Lactate   Result Value Ref Range    Lactate 5.7 (HH) 0.4 - 2.0 mmol/L   Troponin I, High Sensitivity, Initial   Result Value Ref Range    Troponin I, High Sensitivity 102 (HH) 0 - 13 ng/L   Manual Differential   Result Value Ref Range    Neutrophils %, Manual 78.0 40.0 - 80.0 %    Bands %, Manual 16.0 0.0 - 5.0 %    Lymphocytes %, Manual 2.0 13.0 - 44.0 %    Monocytes %, Manual 2.0 2.0 - 10.0 %    Eosinophils %, Manual 0.0 0.0 - 6.0 %    Basophils %, Manual 0.0 0.0 - 2.0 %    Metamyelocytes %, Manual 2.0 0.0 - 0.0 %    Seg Neutrophils Absolute, Manual 7.88 (H) 1.60 - 5.00 x10*3/uL    Bands Absolute, Manual 1.62 (H) 0.00 - 0.50 x10*3/uL    Lymphocytes  Absolute, Manual 0.20 (L) 0.80 - 3.00 x10*3/uL    Monocytes Absolute, Manual 0.20 0.05 - 0.80 x10*3/uL    Eosinophils Absolute, Manual 0.00 0.00 - 0.40 x10*3/uL    Basophils Absolute, Manual 0.00 0.00 - 0.10 x10*3/uL    Metamyelocytes Absolute, Manual 0.20 0.00 - 0.00 x10*3/uL    Total Cells Counted 100     Neutrophils Absolute, Manual 9.50 (H) 1.60 - 5.50 x10*3/uL    RBC Morphology See Below     Polychromasia Mild     Abdifatah Cells Few    Pathologist Review-CBC Differential   Result Value Ref Range    Pathologist Review-CBC Differential       Slide reviewed.  Moderate macrocytic anemia.  Thrombocytopenia with normal platelet morphology.  Consider possibility of nutritional deficiency, hepatic disorder, or other medical issues.  Clinical correlation recommended.   Troponin, High Sensitivity, 1 Hour   Result Value Ref Range    Troponin I, High Sensitivity 99 (HH) 0 - 13 ng/L   Lactate   Result Value Ref Range    Lactate 4.8 (HH) 0.4 - 2.0 mmol/L     XR chest 2 views    Result Date: 12/26/2023  Interpreted By:  Tae William, STUDY: XR CHEST 2 VIEWS;  12/26/2023 11:43 am   INDICATION: Signs/Symptoms:sepsis.   COMPARISON: 11/20/2023   ACCESSION NUMBER(S): [id number]   ORDERING CLINICIAN: KATHY MURPHY   FINDINGS: Small right pleural effusion, decreased compared to prior exam. Right IJ central line still present. Left basilar calcifications still present. No definite focal infiltrate otherwise. Cardiomegaly. Pulmonary vascular congestion. Aortic atherosclerosis.       Pulmonary vascular congestion. Small right pleural effusion.     Signed by: Tae William 12/26/2023 12:08 PM Dictation workstation:   [id number]    CT cervical spine wo IV contrast    Result Date: 12/26/2023  Interpreted By:  Tae William, STUDY: CT CERVICAL SPINE WO IV CONTRAST;  12/26/2023 11:34 am   INDICATION: Signs/Symptoms:fall 4 days ago.   COMPARISON: None.   ACCESSION NUMBER(S): [id number]   ORDERING CLINICIAN: KATHY MURPHY    TECHNIQUE: Axial CT images of the cervical spine are obtained. Axial, coronal and sagittal reconstructions are provided for review.   FINDINGS: No acute fracture or subluxation of the cervical spine. The vertebral body heights are maintained. Mild multilevel degenerative disc height loss with scattered endplate osteophytes. Multilevel facet arthropathy. C2-3: Patent canal and foramina. C3-4: Trace anterolisthesis. Disc bulge causes mild canal stenosis. Patent neural foramina. C4-5: Disc bulge causes mild canal stenosis. Patent neural foramina. C5-6: Endplate osteophyte causes mild canal stenosis. Osteophytes cause mild left and mild right foraminal stenosis. C6-7: Endplate osteophyte causes mild canal stenosis. Osteophytes cause mild left and no right foraminal stenosis. C7-T1: Patent canal and foramina.   No prevertebral soft tissue swelling.   Atherosclerosis. Partially imaged right IJ central line. Partially imaged small right pleural effusion.       No evidence for an acute fracture or subluxation of the cervical spine. Small right pleural effusion.   MACRO: None   Signed by: Tae William 12/26/2023 12:07 PM Dictation workstation:   [id number]    CT head wo IV contrast    Result Date: 12/26/2023  Interpreted By:  Tae William, STUDY: CT HEAD WO IV CONTRAST  12/26/2023 11:34 am   INDICATION: Signs/Symptoms:AMS fell 4 days ago   COMPARISON: 05/31/2023   ACCESSION NUMBER(S): [id number]   ORDERING CLINICIAN: KATHY MURPHY   TECHNIQUE: Contiguous axial CT images of the brain were obtained without IV contrast.   FINDINGS: The ventricles, cisterns and sulci are prominent, consistent with moderate diffuse volume loss. Areas of white matter low attenuation are nonspecific but likely related to chronic microvascular disease. There is intracranial atherosclerosis.   Gray-white differentiation is preserved. No acute intracranial hemorrhage or mass effect. No midline shift. Patent basal cisterns. No extraaxial fluid  collections.   The calvaria is intact. The visualized paranasal sinuses and mastoid air cells are clear.       No acute intracranial pathology.     Signed by: Tae William 12/26/2023 12:04 PM Dictation workstation:   [id number]    XR CHEST 2V FRONTAL/LAT    Result Date: 12/9/2023  * * *Final Report* * * DATE OF EXAM: Dec  8 2023  2:43PM   SVX   [id number]  -  XR CHEST 2V FRONTAL/LAT  / ACCESSION #  [id number] PROCEDURE REASON: History of pneumonia      * * * * Physician Interpretation * * * *  EXAMINATION:  CHEST RADIOGRAPH (2 VIEW FRONTAL & LATERAL) CLINICAL HISTORY: History of pneumonia MQ:  [id number] EXAM DATE/TIME:  12/8/2023 2:43 PM COMPARISON:  No relevant prior studies available. RESULT: Lines, tubes, and devices:  A right venous catheter is in place. Lungs and pleura:  There is a small right pleural effusion.  A right lower lung infiltrate cannot be excluded.  There is mild central vascular prominence.  There are calcifications in the left lower chest which may be calcified granulomata in the left lower lung or left breast. Cardiomediastinal silhouette:  There is mild cardiomegaly.  There is diffuse atherosclerosis of the thoracoabdominal aorta Bones and soft tissues:  Unremarkable.    IMPRESSION: Right pleural effusion and questionable right lower lung parenchymal changes as described above.  A follow-up exam is recommended. : KIRSTEN   Transcribe Date/Time: Dec  9 2023 10:24A Dictated by : JANUSZ ONEAL MD This examination was interpreted and the report reviewed and electronically signed by: JANUSZ ONEAL MD on Dec  9 2023 10:26AM  EST    ECG 12 lead    Result Date: 11/29/2023   Poor data quality, interpretation may be adversely affected Sinus tachycardia with premature atrial complexes. Left axis deviation Septal infarct , age undetermined Abnormal ECG When compared with ECG of 27-JUN-2023 17:07, Heart rate has increased Questionable change in QRS axis Septal infarct is now Present QT has shortened  "Confirmed by Marly Mcnulty ([id number]) on 11/29/2023 3:50:38 PM      Additional Labs:  SCVO2: No results found for: \"[id number]\"    Assessment /Plan    Active Problems:  Metabolic encephalopathy  Distributive shock  Lactic acidosis  Hypothermia  Bradycardia  Acute CHF exacerbation  ESRD on HD  Hyponatremia    Plan:  Neuro:  Metabolic and hepatic encephalopathy  Hyperammonemia  Hx of falls  CT brain negative  -Continue neuro checks per protocol. Pt is drowsy, A/Ox3  -Monitor CAM-ICU    CV:  Distributive shock septic vs cardiogenic requiring pressors  Afib, Bradycardia  Acute CHF exacerbation  Hx DVT on Eliquis  Hx HTN  -Continue home ASA, Eliquis, Lipitor  -Holding home midodrine, lasix  -Continuous cardiac monitoring  -Monitor hemodynamics, maintain LIJ lines  -Titrate IV pressors: Norepinephrine, Epinephrine, Vaso  -Will insert arterial line, FloTrack monitoring  -Check mixed venous gas  -Maintain Goal MAP > 65  -TTE ordered   -Repeat EKG for arrhythmia  -Trending lactate  -BLE venous duplex, LUE arterial duplex ordered  -Monitor and optimize electrolytes, keep K > 4.0, Mag > 2.0  -Cardiology consulted    Pulmonary:  Acute hypoxic respiratory failure  -Currently on 2L NC, wean as tolerated  -VBG ordered  -CXR: pulmonary vascular congestion, right pleural effusion  -CT: bilateral pleural effusions, bibasilar atelectasis, new small nodular densities bilaterally requiring f/up in 3mo  -Continuous pulse oximetry, maintain SPO2 >90%  -Aggressive pulmonary toileting/bronchial hygiene  -Encourage incentive spirometry  -OOB to chair, PT/OT to evaluate and treat    GI  - Diet: NPO except meds, bedside swallow when able to participate  - GI Prophylaxis: protonix  - Bowel regimen: colace, miralax  - Last BM: PTA, moderate stool seen on CT    Renal/:  ESRD on HD T-Th-Sa, last HD 12/23  LUE AVG inserted 12/15  Hyponatremia  -Indwelling catheter, oliguric at baseline  -Consult nephrology  -Likely requiring CRRT as hemodynamics " can not tolerate IHD  -Renal function: BUN/Cr 56/3.35  -Daily RFP,Mg,Phos  - Fluids: 2 L LR per sepsis protocol  - Electrolytes: Replete per protocol, goal K>4 Phos >3 Mg >2    Heme  Chronic anemia  -Monitor HH, HH 7.8/26.7  -Daily CBC, coags  -Monitor for s/sx bleeding  -Bleeding noted at LIJ site, holding Eliquis PM dose today  -VTE Prophylaxis: None, Eliquis    Endo  DM?  Holding Januvia  -Monitor blood glucose, SSI   -Goal BS <180  -Follow hypoglycemic protocol  -TSH pending    ID:  -Tmax: hypthermic, 30.4C on Javid Hugger  -WBC 10.1  -Consult ID   -Abx: Empiric Zosyn 4.5g given in ED, renal-dosing Q6, Vancomycin per pharmacy, SDS hydrocortisone  -Cultures:  Bcx pending  Urine pending  Sputum ordered  Wound BLE- previous cx +pseudomonas, candida; repeat ordered    Lines/Tubes/Drains  RSC HD cath inserted 6/2023  LIJ TLC inserted 12/26  *Arterial line  *Wong with temp probe      Disposition: ICU    Olga HOLLEY CNP  Pulmonology & Critical Care Medicine  [facility]

## 2023-12-26 NOTE — ED PROVIDER NOTES
EMERGENCY DEPARTMENT ENCOUNTER      Pt Name: Kiara Doss  MRN: [id number]  Birthdate 1935  Date of evaluation: 12/26/2023  Provider: Dhaval Sanders DO    CHIEF COMPLAINT       Chief Complaint   Patient presents with    Weakness, Gen     Son reports pt had fistula done last week. Reports generalized weakness in legs. Home health care nurse reported for pt to be seen in ER.            HISTORY OF PRESENT ILLNESS    HPI    88-year-old female with past medical history of ESRD on dialysis with recent shunt placement 2 weeks ago, congestive heart failure, DVT, hypertension presenting to the emergency department for evaluation of progressively worsening fatigue, generalized weakness.  Son states this got to the point where he has to almost carry patient to assist her to the restroom.  Over the past week in particular patient has had a rapid decline including slurred speech.  Denies noticing any focal upper or lower extremity weakness or facial droop.  Son states he has not noticed any cough, shortness of breath, nausea, vomiting, diarrhea or rash.  Does report the patient had a an unwitnessed fall 4 days ago.    Nursing Notes were reviewed.    PAST MEDICAL HISTORY     Past Medical History:   Diagnosis Date    Allergic rhinitis     Cardiac murmur     CHF (congestive heart failure) (CMS/HCC)     Chronic anemia     Current use of long term anticoagulation     End-stage renal disease on hemodialysis (CMS/HCC)     First degree AV block     Gout     Heart failure with reduced ejection fraction (CMS/HCC)     History of basal cell carcinoma     History of deep vein thrombosis     Hyperlipidemia     Hypertension     Iron deficiency     Mitral valve regurgitation     Non-insulin dependent type 2 diabetes mellitus (CMS/HCC)     Osteoarthritis     Osteoporosis          SURGICAL HISTORY       Past Surgical History:   Procedure Laterality Date    BASAL CELL CARCINOMA EXCISION      IR CVC TUNNELED  06/30/2023    IR CVC  TUNNELED 6/30/2023 [facility]IO    LAMINECTOMY      TONSILLECTOMY  03/25/2014    Tonsillectomy         CURRENT MEDICATIONS       Current Discharge Medication List        CONTINUE these medications which have NOT CHANGED    Details   allopurinol (Zyloprim) 100 mg tablet Take 1 tablet (100 mg) by mouth once daily.      ascorbic acid (Vitamin C) 500 mg tablet Take 1 tablet (500 mg) by mouth once daily.      aspirin 81 mg EC tablet Take 1 tablet (81 mg) by mouth once daily.      atorvastatin (Lipitor) 10 mg tablet Take 1 tablet (10 mg) by mouth once daily.      B complex-vitamin C-folic acid (Nephro-Pollo) 0.8 mg tablet Take 1 tablet by mouth once daily.      calcitriol (Rocaltrol) 0.25 mcg capsule Take 1 capsule (0.25 mcg) by mouth once daily.      calcium carbonate/vitamin D3 (CALCIUM + D ORAL) Take 1 tablet by mouth once daily.      Eliquis 5 mg tablet Take 1 tablet (5 mg) by mouth every 12 hours.      furosemide (Lasix) 80 mg tablet Take 1 tablet (80 mg) by mouth 2 times a day.      Januvia 25 mg tablet Take 1 tablet (25 mg) by mouth once daily.      midodrine (Proamatine) 10 mg tablet Take 1 tablet (10 mg) by mouth 3 (three) times a week. Tuesday, Thursday and Saturday.             ALLERGIES     Lisinopril, Raloxifene, and Statins-hmg-coa reductase inhibitors    FAMILY HISTORY       Family History   Problem Relation Name Age of Onset    Coronary artery disease Mother      COPD Father            SOCIAL HISTORY       Social History     Socioeconomic History    Marital status:      Spouse name: None    Number of children: None    Years of education: None    Highest education level: None   Occupational History    None   Tobacco Use    Smoking status: Never    Smokeless tobacco: Never   Substance and Sexual Activity    Alcohol use: Never    Drug use: Never    Sexual activity: None   Other Topics Concern    None   Social History Narrative    None     Social Determinants of Health     Financial Resource Strain: Low  Risk  (11/20/2023)    Overall Financial Resource Strain (CARDIA)     Difficulty of Paying Living Expenses: Not hard at all   Food Insecurity: Not on file   Transportation Needs: No Transportation Needs (11/20/2023)    PRAPARE - Transportation     Lack of Transportation (Medical): No     Lack of Transportation (Non-Medical): No   Physical Activity: Not on file   Stress: Not on file   Social Connections: Not on file   Intimate Partner Violence: Not on file   Housing Stability: Low Risk  (11/20/2023)    Housing Stability Vital Sign     Unable to Pay for Housing in the Last Year: No     Number of Places Lived in the Last Year: 1     Unstable Housing in the Last Year: No       SCREENINGS                        PHYSICAL EXAM    (up to 7 for level 4, 8 or more for level 5)     ED Triage Vitals [12/26/23 1010]   Temp Heart Rate Resp BP   35 °C (95 °F) 68 20 100/58      SpO2 Temp Source Heart Rate Source Patient Position   94 % Temporal Monitor Sitting      BP Location FiO2 (%)     Right arm --       Physical Exam  Vitals and nursing note reviewed.   Constitutional:       General: She is in acute distress.      Appearance: She is ill-appearing and toxic-appearing. She is not diaphoretic.      Comments: Patient is confused only oriented to self.   HENT:      Head: Normocephalic and atraumatic.      Right Ear: External ear normal.      Left Ear: External ear normal.      Nose: Nose normal. No congestion or rhinorrhea.      Mouth/Throat:      Mouth: Mucous membranes are moist.      Pharynx: Oropharynx is clear. No oropharyngeal exudate or posterior oropharyngeal erythema.   Eyes:      General: No scleral icterus.        Right eye: No discharge.         Left eye: No discharge.      Extraocular Movements: Extraocular movements intact.      Conjunctiva/sclera: Conjunctivae normal.      Pupils: Pupils are equal, round, and reactive to light.   Cardiovascular:      Rate and Rhythm: Normal rate. Rhythm irregular.      Pulses: Normal  pulses.      Heart sounds: Normal heart sounds. No murmur heard.     No friction rub. No gallop.   Pulmonary:      Effort: Pulmonary effort is normal. No respiratory distress.      Breath sounds: Normal breath sounds. No stridor. No wheezing, rhonchi or rales.   Abdominal:      General: There is distension.      Palpations: Abdomen is soft. There is no mass.      Tenderness: There is no abdominal tenderness. There is no guarding or rebound.      Hernia: No hernia is present.   Musculoskeletal:         General: No swelling, tenderness, deformity or signs of injury. Normal range of motion.      Cervical back: Normal range of motion and neck supple. No rigidity or tenderness.      Right lower leg: Edema present.      Left lower leg: Edema present.   Lymphadenopathy:      Cervical: No cervical adenopathy.   Skin:     Coloration: Skin is pale. Skin is not jaundiced.      Findings: No bruising, erythema, lesion or rash.   Neurological:      Mental Status: She is disoriented.      GCS: GCS eye subscore is 4. GCS verbal subscore is 4. GCS motor subscore is 6.      Motor: Weakness present.      Comments: Patient is confused, oriented only to self, only following simple commands.  Generalized weakness without focal deficits.          DIAGNOSTIC RESULTS     LABS:  Labs Reviewed   CBC WITH AUTO DIFFERENTIAL - Abnormal       Result Value    WBC 10.1      nRBC 3.0 (*)     RBC 2.26 (*)     Hemoglobin 7.8 (*)     Hematocrit 26.7 (*)      (*)     MCH 34.5 (*)     MCHC 29.2 (*)     RDW 24.0 (*)     Platelets 44 (*)     Immature Granulocytes %, Automated 1.0 (*)     Immature Granulocytes Absolute, Automated 0.10     COMPREHENSIVE METABOLIC PANEL - Abnormal    Glucose 126 (*)     Sodium 129 (*)     Potassium 4.2      Chloride 90 (*)     Bicarbonate 21      Anion Gap 22 (*)     Urea Nitrogen 56 (*)     Creatinine 3.35 (*)     eGFR 13 (*)     Calcium 9.5      Albumin 2.6 (*)     Alkaline Phosphatase 188 (*)     Total Protein 5.0  (*)     AST 55 (*)     Bilirubin, Total 1.1      ALT 24     LACTATE - Abnormal    Lactate 5.7 (*)     Narrative:     Venipuncture immediately after or during the administration of Metamizole may lead to falsely low results. Testing should be performed immediately  prior to Metamizole dosing.   SERIAL TROPONIN-INITIAL - Abnormal    Troponin I, High Sensitivity 102 (*)     Narrative:     Less than 99th percentile of normal range cutoff-  Female and children under 18 years old <14 ng/L; Male <21 ng/L: Negative  Repeat testing should be performed if clinically indicated.     Female and children under 18 years old 14-50 ng/L; Male 21-50 ng/L:  Consistent with possible cardiac damage and possible increased clinical   risk. Serial measurements may help to assess extent of myocardial damage.     >50 ng/L: Consistent with cardiac damage, increased clinical risk and  myocardial infarction. Serial measurements may help assess extent of   myocardial damage.      NOTE: Children less than 1 year old may have higher baseline troponin   levels and results should be interpreted in conjunction with the overall   clinical context.     NOTE: Troponin I testing is performed using a different   testing methodology at [facility] than at [facility]. Direct result comparisons should only   be made within the same method.   SERIAL TROPONIN, 1 HOUR - Abnormal    Troponin I, High Sensitivity 99 (*)     Narrative:     Less than 99th percentile of normal range cutoff-  Female and children under 18 years old <14 ng/L; Male <21 ng/L: Negative  Repeat testing should be performed if clinically indicated.     Female and children under 18 years old 14-50 ng/L; Male 21-50 ng/L:  Consistent with possible cardiac damage and possible increased clinical   risk. Serial measurements may help to assess extent of myocardial damage.     >50 ng/L: Consistent with cardiac damage, increased clinical risk and  myocardial infarction.  Serial measurements may help assess extent of   myocardial damage.      NOTE: Children less than 1 year old may have higher baseline troponin   levels and results should be interpreted in conjunction with the overall   clinical context.     NOTE: Troponin I testing is performed using a different   testing methodology at [facility] than at other   [facility]. Direct result comparisons should only   be made within the same method.   BLOOD GAS VENOUS FULL PANEL - Abnormal    POCT pH, Venous 7.41      POCT pCO2, Venous 35 (*)     POCT pO2, Venous 52 (*)     POCT SO2, Venous 80 (*)     POCT Oxy Hemoglobin, Venous 77.3 (*)     POCT Hematocrit Calculated, Venous 24.0 (*)     POCT Sodium, Venous 126 (*)     POCT Potassium, Venous 4.5      POCT Chloride, Venous 92 (*)     POCT Ionized Calicum, Venous 1.28      POCT Glucose, Venous 141 (*)     POCT Lactate, Venous 5.2 (*)     POCT Base Excess, Venous -2.1 (*)     POCT HCO3 Calculated, Venous 22.2      POCT Hemoglobin, Venous 7.9 (*)     POCT Anion Gap, Venous 16.0      Patient Temperature        FiO2 21      Critical Called By [facility]      Critical Called To ED MD      Critical Call Time [phone]      Critical Read Back Y     LACTATE - Abnormal    Lactate 4.8 (*)     Narrative:     Venipuncture immediately after or during the administration of Metamizole may lead to falsely low results. Testing should be performed immediately  prior to Metamizole dosing.   LACTATE - Abnormal    Lactate 5.0 (*)     Narrative:     Venipuncture immediately after or during the administration of Metamizole may lead to falsely low results. Testing should be performed immediately  prior to Metamizole dosing.   B-TYPE NATRIURETIC PEPTIDE - Abnormal    BNP 1,268 (*)     Narrative:        <100 pg/mL - Heart failure unlikely  100-299 pg/mL - Intermediate probability of acute heart                  failure exacerbation. Correlate with clinical                  context and patient  history.    >=300 pg/mL - Heart Failure likely. Correlate with clinical                  context and patient history.    BNP testing is performed using different testing methodology at [facility] than at other [facility]. Direct result comparisons should only be made within the same method.      TSH - Abnormal    Thyroid Stimulating Hormone 8.23 (*)     Narrative:     TSH testing is performed using different testing methodology at [facility] than at other [facility]. Direct result comparisons should only be made within the same method.     THYROXINE, FREE - Abnormal    Thyroxine, Free 0.50 (*)     Narrative:     Thyroxine Free testing is performed using different testing methodology at [facility] than at other [facility]. Direct result comparisons should only be made within the same method.    Biotin can cause falsely elevated free T4 results. Patients taking a Biotin dose of up to 10 mg/day should refrain from taking Biotin for 24 hours before sample collection. Patient taking a Biotin dose of >10 mg/day should consult with their physician or the laboratory before the blood draw.   AMMONIA - Abnormal    Ammonia 69 (*)    MANUAL DIFFERENTIAL - Abnormal    Neutrophils %, Manual 78.0      Bands %, Manual 16.0      Lymphocytes %, Manual 2.0      Monocytes %, Manual 2.0      Eosinophils %, Manual 0.0      Basophils %, Manual 0.0      Metamyelocytes %, Manual 2.0      Seg Neutrophils Absolute, Manual 7.88 (*)     Bands Absolute, Manual 1.62 (*)     Lymphocytes Absolute, Manual 0.20 (*)     Monocytes Absolute, Manual 0.20      Eosinophils Absolute, Manual 0.00      Basophils Absolute, Manual 0.00      Metamyelocytes Absolute, Manual 0.20      Total Cells Counted 100      Neutrophils Absolute, Manual 9.50 (*)     RBC Morphology See Below      Polychromasia Mild      Abdifatah Cells Few     BLOOD CULTURE   BLOOD CULTURE   URINE CULTURE   TISSUE/WOUND CULTURE/SMEAR    STAPHYLOCOCCUS AUREUS/MRSA COLONIZATION, CULTURE   TROPONIN SERIES- (INITIAL, 1 HR)    Narrative:     The following orders were created for panel order Troponin I Series, High Sensitivity (0, 1 HR).  Procedure                               Abnormality         Status                     ---------                               -----------         ------                     Troponin I, High Sensiti...[[id number]]  Abnormal            Final result               Troponin, High Sensitivi...[[id number]]  Abnormal            Final result                 Please view results for these tests on the individual orders.   URINALYSIS WITH REFLEX MICROSCOPIC   PROCALCITONIN   URINALYSIS WITH REFLEX CULTURE AND MICROSCOPIC    Narrative:     The following orders were created for panel order Urinalysis with Reflex Culture and Microscopic.  Procedure                               Abnormality         Status                     ---------                               -----------         ------                     Urinalysis with Reflex C...[[id number]]                                                 Extra Urine Gray Tube[[id number]]                                                         Please view results for these tests on the individual orders.   URINALYSIS WITH REFLEX CULTURE AND MICROSCOPIC   EXTRA URINE GRAY TUBE   HEMOGLOBIN A1C   POCT GLUCOSE METER   POCT GLUCOSE METER   PATH REVIEW-CBC DIFFERENTIAL    Pathologist Review-CBC Differential        Value: Slide reviewed.  Moderate macrocytic anemia.  Thrombocytopenia with normal platelet morphology.  Consider possibility of nutritional deficiency, hepatic disorder, or other medical issues.  Clinical correlation recommended.       All other labs were within normal range or not returned as of this dictation.    Imaging  XR chest 1 view   Final Result   Interval left jugular catheter placement. No obvious pneumothorax.        Lungs appear hyperinflated.        Limited exam. Question of  right pleural effusion.        MACRO:   none        Signed by: Katja Bentley 12/26/2023 4:21 PM   Dictation workstation:   [id number]      CT chest abdomen pelvis wo IV contrast   Final Result   CHEST:   1. Bilateral pleural effusions and basilar atelectasis, as above.   2. Small nodular densities in the lungs bilaterally, new relative to   recent CT of the chest. Recommendation is for follow-up examination   in 3 months with repeat CT of the chest.   3. Atherosclerotic disease, as above.        ABDOMEN-PELVIS:   1. Extensive colonic diverticulosis, without evidence of acute   diverticulitis.   2. Moderate to large amount of stool in the distal rectosigmoid colon.   3. No evidence of bowel obstruction, free intraperitoneal air or   abnormal intra-abdominal fluid collection.   4. Atherosclerotic disease, as above.   5. Cholelithiasis, as above.        MACRO:   None        Signed by: Tc Garcia 12/26/2023 3:28 PM   Dictation workstation:   [id number]      XR chest 2 views   Final Result   Pulmonary vascular congestion. Small right pleural effusion.             Signed by: Tae William 12/26/2023 12:08 PM   Dictation workstation:   [id number]      CT head wo IV contrast   Final Result   No acute intracranial pathology.             Signed by: Tae William 12/26/2023 12:04 PM   Dictation workstation:   [id number]      CT cervical spine wo IV contrast   Final Result   No evidence for an acute fracture or subluxation of the cervical   spine. Small right pleural effusion.        MACRO:   None        Signed by: Tae William 12/26/2023 12:07 PM   Dictation workstation:   [id number]      Transthoracic Echo (TTE) Complete    (Results Pending)   Lower extremity venous duplex bilateral    (Results Pending)   Vascular US upper extremity arterial duplex left    (Results Pending)        Procedures  Central Line    Performed by: Dhaval Sanders DO  Authorized by: Rojas Jansen DO    Consent:     Consent obtained:   Written    Consent given by:  Guardian    Risks discussed:  Arterial puncture, incorrect placement, nerve damage, bleeding, infection and pneumothorax    Alternatives discussed:  No treatment  Universal protocol:     Procedure explained and questions answered to patient or proxy's satisfaction: yes      Relevant documents present and verified: yes      Test results available: yes      Imaging studies available: yes      Required blood products, implants, devices, and special equipment available: yes      Immediately prior to procedure, a time out was called: yes      Patient identity confirmed:  Arm band and provided demographic data  Pre-procedure details:     Indication(s): central venous access      Hand hygiene: Hand hygiene performed prior to insertion      Sterile barrier technique: All elements of maximal sterile technique followed      Skin preparation:  Chlorhexidine    Skin preparation agent: Skin preparation agent completely dried prior to procedure    Sedation:     Sedation type:  None  Anesthesia:     Anesthesia method:  Local infiltration    Local anesthetic:  Lidocaine 1% w/o epi  Procedure details:     Location:  L internal jugular    Site selection rationale:  Patient has tunneled RIJ dialysis catheter    Patient position:  Supine    Procedural supplies:  Triple lumen    Catheter size:  7 Fr    Landmarks identified: yes      Ultrasound guidance: yes      Ultrasound guidance timing: real time      Sterile ultrasound techniques: Sterile gel and sterile probe covers were used      Number of attempts:  1    Successful placement: yes    Post-procedure details:     Post-procedure:  Dressing applied and line sutured    Assessment:  Blood return through all ports, no pneumothorax on x-ray, free fluid flow and placement verified by x-ray    Procedure completion:  Tolerated       EMERGENCY DEPARTMENT COURSE/MDM:     Diagnoses as of 12/26/23 1650   Shock (CMS/HCC)   Elevated troponin   Hypothermia, initial  encounter   Lactic acidosis        Medical Decision Making    88-year-old female with past medical history of ESRD on dialysis with recent shunt placement 2 weeks ago, congestive heart failure, DVT, hypertension presenting to the emergency department for evaluation of progressively worsening fatigue, generalized weakness.  Patient is toxic appearing, hypothermic with a temperature of 95 °F normotensive, not tachypneic and saturating 94% on room air on triage vitals.  However during my exam patient was hypotensive with systolic blood pressure in the 70s.  Septic septic and cardiac workup ordered.  Given patient's history of HFrEF and ESRD on dialysis 500 cc fluid bolus of normal saline ordered to start due to concern for fluid overload given patient's lower extremity edema on physical exam.  Plan to reassess patient's fluid status with IVC and chest x-ray to evaluate for fluid overload.  Empiric antibiotic treatment with vancomycin and Zosyn ordered.  CT head and CT C-spine ordered due to altered mental status and a reported fall 4 days ago.    EKG shows atrial fibrillation with slow ventricular response with a ventricular rate of 45 bpm, QRS of 104, QTc 474.  No acute ST changes noted.    EKG2: Atrial fibrillation with slow ventricular response with a ventricular rate of 40 bpm with left axis deviation,  ms, QTc 490 ms.  No evidence of acute ischemic changes.    Labs significant for lactate of 5.7, BNP of 1268, CRP and troponin 102.  No leukocytosis however patient has an elevated absolute neutrophil count of 9.50.  Unable to collect a urine sample for urinalysis as patient produces minimal urine.  Patient's blood pressure remained soft in the 90s systolic after fluid bolus.  POC cardiac and IVC ultrasound shows plethoric IVC that does not collapse with respirations as well as diffusely decreased cardiac contractility suggesting possible component of cardiogenic shock.  Chest x-ray shows pulmonary vascular  congestion and small right pleural effusion, but otherwise no acute pathology.  CT head and CT C-spine unremarkable for acute pathology.  pH of 7.41 on VBG.  Repeat lactate after 500 cc bolus of 4.8.    Patient became progressively more hypotensive into the 70s systolic with maps in the low 40s given signs of fluid overload on physical exam, chest x-ray, and ultrasound with concern for possible cardiogenic shock patient was given another 500 cc fluid bolus rather than a full 30 cc/kg bolus.  Peripheral norepinephrine infusion ordered.  Repeat troponin flat at 99.  CT chest abdomen pelvis without IV contrast due to concerns for possible sepsis without source at this time.  Left internal jugular central venous access was obtained after obtaining consent.  See procedure note for details.     Attending physician discussed patient case with ICU attending Dr. Easley who agreed accept patient to her service for further evaluation and treatment of septic versus cardiogenic shock.    Patient and or family in agreement and understanding of treatment plan.  All questions answered.      I reviewed the case with the attending ED physician. The attending ED physician agrees with the plan. Patient and/or patient´s representative was counseled regarding labs, imaging, likely diagnosis, and plan. All questions were answered.    Dhaval Sanders DO  Emergency Medicine, PGY2    ED Medications administered this visit:    Medications   norepinephrine (Levophed) 8 mg in dextrose 5% 250 mL (0.032 mg/mL) infusion (premix) (0.12 mcg/kg/min × 60.3 kg intravenous Rate/Dose Verify 12/26/23 1515)   EPINEPHrine (Adrenalin) 4 mg in dextrose 5% 250 mL (16 mcg/mL) infusion (premix) (0.01 mcg/kg/min × 60.3 kg intravenous New Bag 12/26/23 1548)   vasopressin (Vasostrict) 0.2 unit/mL infusion (has no administration in time range)   perflutren lipid microspheres (Definity) injection 0.5-10 mL of dilution (has no administration in time range)    sulfur hexafluoride microsphr (Lumason) injection 24.28 mg (has no administration in time range)   perflutren protein A microsphere (Optison) injection 0.5 mL (has no administration in time range)   piperacillin-tazobactam-dextrose (Zosyn) IV 2.25 g (has no administration in time range)   vancomycin (Vancocin) in  mL IV 1.5 g (has no administration in time range)   oxygen (O2) therapy (has no administration in time range)   alteplase (Cathflo Activase) injection 2 mg (has no administration in time range)   allopurinol (Zyloprim) tablet 100 mg ( oral Dose Auto Held 12/30/23 0700)   ascorbic acid (Vitamin C) tablet 500 mg (has no administration in time range)   aspirin EC tablet 81 mg (has no administration in time range)   B complex-vitamin C tablet 1 tablet (has no administration in time range)   atorvastatin (Lipitor) tablet 10 mg (has no administration in time range)   apixaban (Eliquis) tablet 5 mg (has no administration in time range)   furosemide (Lasix) tablet 80 mg ( oral Dose Auto Held 12/30/23 2100)   hydrocortisone sod succ (PF) (Solu-CORTEF) injection 50 mg (has no administration in time range)   dextrose 50 % injection 25 g (has no administration in time range)   glucagon (Glucagen) injection 1 mg (has no administration in time range)   dextrose 10 % in water (D10W) infusion (has no administration in time range)   insulin lispro (HumaLOG) injection 0-5 Units (has no administration in time range)   pantoprazole (ProtoNix) injection 40 mg (has no administration in time range)   docusate sodium (Colace) capsule 100 mg (has no administration in time range)   sodium chloride 0.9 % bolus 500 mL (0 mL intravenous Stopped 12/26/23 1141)   piperacillin-tazobactam-dextrose (Zosyn) IV 4.5 g (0 g intravenous Stopped 12/26/23 1141)   vancomycin (Vancocin) in  mL IV 1,500 mg (1,500 mg intravenous Given 12/26/23 1435)   sodium chloride 0.9 % bolus 500 mL (0 mL intravenous Stopped 12/26/23 1506)    lidocaine PF (Xylocaine) injection  - Omnicell Override Pull (  Given 12/26/23 1340)   lactated Ringer's bolus 1,000 mL (0 mL intravenous Stopped 12/26/23 1618)       New Prescriptions from this visit:    Current Discharge Medication List          Follow-up:  No follow-up provider specified.      Final Impression:   1. Shock (CMS/HCC)    2. Bradycardia    3. Septic shock (CMS/HCC)    4. Cardiogenic shock (CMS/HCC)    5. Sepsis due to cellulitis (CMS/HCC)    6. Swelling of arm    7. Other specified symptoms and signs involving the circulatory and respiratory systems    8. Elevated troponin    9. Hypothermia, initial encounter    10. Lactic acidosis          (Please note that portions of this note were completed with a voice recognition program.  Efforts were made to edit the dictations but occasionally words are mis-transcribed.)     Dhaval Sanders,   Resident  12/26/23 1088

## 2023-12-26 NOTE — CARE PLAN
The patient's goals for the shift include  n/a    The clinical goals for the shift include patient will remain HDS through end of shift    Over the shift, the patient did not make progress toward the following goals. Barriers to progression include hypotension. Recommendations to address these barriers include titrate pressors per order.

## 2023-12-27 LAB — PROCALCITONIN SERPL-MCNC: 2.75 NG/ML

## 2023-12-27 NOTE — SIGNIFICANT EVENT
Called to bedside by RN after asystole developed on telemetry. The patient was seen and examined at bedside: Kiara Doss was identified by armband. On physical exam the patient was pale and not responsive to sternal rub, voice, or painful stimuli. No spontaneous movement. Pupils were bilaterally fixed and dilated and nonreactive to light. Lips were cyanotic. Corneal reflex was absent. No carotid or radial pulses were palpable. No spontaneous respiratory effort. No cardiac or respiratory sounds were heard on auscultation after >60 seconds. Asystole was observed on cardiac monitor. Patient was declared  at 23:48 from Septic Shock. Family was notified. Discussed with attending physician.

## 2023-12-27 NOTE — CARE PLAN
Brief nephrology note:    88-year-old female admitted to the intensive care unit due to distributive shock septic versus cardiogenic requiring 3 pressors including norepinephrine, epinephrine and vasopressin.  Receiving IV fluids per sepsis protocol.   Labs reviewed serum creatinine 3.3 mg deciliter potassium 4.2, bicarb 21.   No urgent indication for renal replacement therapy.   To continue current resuscitation.   Full evaluation to follow, thank you very much!

## 2023-12-27 NOTE — DISCHARGE SUMMARY
Discharge Diagnosis  Septic Shock    Issues Requiring Follow-Up  None    Test Results Pending At Discharge  Pending Labs       Order Current Status    Blood Gas Venous Full Panel In process    Hemoglobin A1C In process    Procalcitonin In process    Staphylococcus Aureus/MRSA Colonization, Culture In process    Blood Culture Preliminary result    Blood Culture Preliminary result            Hospital Course  Kiara Doss was an 88 year old female with PMH ESRD on T-Th-Sa HD, CHF, DVT on Eliquis, and HTN. Per her son, Ivan, she had been fatigued, weak and slurring speech for the past week. 12/16 she was seen by her home health RN and advised to go to the ER for evaluation. She was previously admitted [facility] for septic shock 2/2 cellulitis of RLE, tissue cx at that time was positive for pseudomonas and candida. Per her son her BLE have been worsening and he attempted to reach out to her PCP office over the holiday weekend for additional antibiotics, she completed her course of Augmentin. Also of note she had a LUE AVG placed on 12/15, a few days after insertion her left arm had become cool and pale/light purple with decreased motor function and sensation. However, per her son who she lived with she had not been recently ill or in contact with anyone sick, no fever/chills. Denied complaint of SOB, CP, cough. She was oliguric with decreasing UO on her HD days. Denied difficulty with eating/drinking or changes in bowel/bladder habits. Her BLE were wrapped with ACE bandages, cap refill >3, light purple, cool. LUE distal to AVG, light purple, bruising, cool, cap refill >3.     Upon arrival to ER she was found to be lethargic, mumbling words, without facial droop or focal deficits noted. She was hypothermic 30.4 C temporal requiring Javid Hugger. Bradycardic- 40-50s, EKG showed Afib with slow ventricular response, HR 40bpm. Hypotensive, given sepsis fluids of 2L, started on norepinephrine and epinephrine due to bradycardia.  Labs revealed SANDRA 102-99, Lactate 5.7-4.8, BUN/Cr 56/3.35, BNP 1268, WBC 10.1, H/H 7.8/26.7. Imaging CXR- pulmonary vascular congestion and persistent right pleural effusion. CT head/neck negative for acute process. CT C/A/P without contrast showed right pleural effusion, RLL infiltrate.     Admitted to ICU for treatment of shock 2/2 sepsis vs cardiogenic requiring vasopressors, ESRD with potential need for CRRT. While in ICU, mixed venous gas showed rising lactate to 12.5, while on triple pressors. Mixed venous also showed elevated O2 consistent with septic rather than cardiogenic source of shock. POCT showed hypoglycemia which was managed with D50. Viral swab results came back, she was found to be covid positive.     Discussed goals of care with medical power of , son, and endorsed that he and his mother have had extensive discussion about goals of care for her. She was not interested in heroic measures and they determined that focus would be on comfort.  After remainder of close family arrived, CODE STATUS was transitioned to DNR-CC, end-of-life order set was initiated and vasoactive medications were discontinued. She passed away from septic shock 2/2 covid at 23:48.     Pertinent Physical Exam At Time of Discharge  Physical Exam  Eyes:      Pupils:      Right eye: Pupil is not reactive.      Left eye: Pupil is not reactive.   Cardiovascular:      Pulses:           Carotid pulses are 0 on the right side and 0 on the left side.       Radial pulses are 0 on the right side and 0 on the left side.        Femoral pulses are 0 on the right side and 0 on the left side.     Comments: No detectable cardiovascular activity  Pulmonary:      Comments: Apneic  Skin:     General: Skin is cool.      Coloration: Skin is ashen and pale.   Neurological:      Mental Status: She is unresponsive.      GCS: GCS eye subscore is 1. GCS verbal subscore is 1. GCS motor subscore is 1.       Home Medications     Medication List       ASK your doctor about these medications     allopurinol 100 mg tablet; Commonly known as: Zyloprim   ascorbic acid 500 mg tablet; Commonly known as: Vitamin C   aspirin 81 mg EC tablet   atorvastatin 10 mg tablet; Commonly known as: Lipitor   B complex-vitamin C-folic acid 0.8 mg tablet; Commonly known as:   Nephro-Pollo   calcitriol 0.25 mcg capsule; Commonly known as: Rocaltrol   CALCIUM + D ORAL   Eliquis 5 mg tablet; Generic drug: apixaban   furosemide 80 mg tablet; Commonly known as: Lasix   Januvia 25 mg tablet; Generic drug: SITagliptin phosphate   * midodrine 10 mg tablet; Commonly known as: Proamatine   * midodrine 10 mg tablet; Commonly known as: Proamatine; Take 1 tablet   (10 mg) by mouth 3 times a week.  * This list has 2 medication(s) that are the same as other medications   prescribed for you. Read the directions carefully, and ask your doctor or   other care provider to review them with you.         Jabier Renteria, DO

## 2023-12-28 LAB — STAPHYLOCOCCUS SPEC CULT: NORMAL

## 2023-12-29 NOTE — ED PROCEDURE NOTE
Procedure  Critical Care    Performed by: Rojas Jansen DO  Authorized by: Rojas Jansen DO    Critical care provider statement:     Critical care time (minutes):  32    Critical care time was exclusive of:  Separately billable procedures and treating other patients and teaching time    Critical care was necessary to treat or prevent imminent or life-threatening deterioration of the following conditions:  Circulatory failure and sepsis    Critical care was time spent personally by me on the following activities:  Development of treatment plan with patient or surrogate, discussions with consultants, evaluation of patient's response to treatment, examination of patient, review of old charts, re-evaluation of patient's condition, pulse oximetry, ordering and review of radiographic studies, ordering and review of laboratory studies and obtaining history from patient or surrogate    I assumed direction of critical care for this patient from another provider in my specialty: no      Care discussed with: admitting provider                 Rojas Jansen DO  12/29/23 0101

## 2023-12-30 LAB
BACTERIA BLD AEROBE CULT: ABNORMAL
BACTERIA BLD AEROBE CULT: ABNORMAL
BACTERIA BLD CULT: ABNORMAL
BACTERIA BLD CULT: ABNORMAL
BACTERIA BLD CULT: NORMAL
GRAM STN SPEC: ABNORMAL
GRAM STN SPEC: ABNORMAL

## 2024-01-02 LAB
ATRIAL RATE: 31 BPM
ATRIAL RATE: 44 BPM
Q ONSET: 214 MS
Q ONSET: 215 MS
QRS COUNT: 6 BEATS
QRS COUNT: 7 BEATS
QRS DURATION: 102 MS
QRS DURATION: 104 MS
QT INTERVAL: 548 MS
QT INTERVAL: 602 MS
QTC CALCULATION(BAZETT): 474 MS
QTC CALCULATION(BAZETT): 490 MS
QTC FREDERICIA: 497 MS
QTC FREDERICIA: 525 MS
R AXIS: -55 DEGREES
R AXIS: -57 DEGREES
T AXIS: 263 DEGREES
T AXIS: 267 DEGREES
T OFFSET: 488 MS
T OFFSET: 516 MS
VENTRICULAR RATE: 40 BPM
VENTRICULAR RATE: 45 BPM

## 2024-01-11 LAB
ANION GAP BLDV CALCULATED.4IONS-SCNC: 25 MMOL/L (ref 10–25)
BASE EXCESS BLDV CALC-SCNC: -13.9 MMOL/L (ref -2–3)
BODY TEMPERATURE: ABNORMAL
CA-I BLDV-SCNC: 1.21 MMOL/L (ref 1.1–1.33)
CHLORIDE BLDV-SCNC: 92 MMOL/L (ref 98–107)
GLUCOSE BLDV-MCNC: 179 MG/DL (ref 74–99)
HCO3 BLDV-SCNC: 10.5 MMOL/L (ref 22–26)
HCT VFR BLD EST: 24 % (ref 36–46)
HGB BLDV-MCNC: 8 G/DL (ref 12–16)
INHALED O2 CONCENTRATION: 28 %
LACTATE BLDV-SCNC: 12.5 MMOL/L (ref 0.4–2)
OXYHGB MFR BLDV: 70.2 % (ref 45–75)
PCO2 BLDV: 20 MM HG (ref 41–51)
PH BLDV: 7.33 PH (ref 7.33–7.43)
PO2 BLDV: 51 MM HG (ref 35–45)
POTASSIUM BLDV-SCNC: 5.1 MMOL/L (ref 3.5–5.3)
SAO2 % BLDV: 73 % (ref 45–75)
SODIUM BLDV-SCNC: 122 MMOL/L (ref 136–145)